# Patient Record
Sex: MALE | Race: WHITE | Employment: FULL TIME | URBAN - METROPOLITAN AREA
[De-identification: names, ages, dates, MRNs, and addresses within clinical notes are randomized per-mention and may not be internally consistent; named-entity substitution may affect disease eponyms.]

---

## 2017-09-22 ENCOUNTER — TELEPHONE (OUTPATIENT)
Dept: ORTHOPEDIC SURGERY | Age: 52
End: 2017-09-22

## 2019-04-29 ENCOUNTER — HOSPITAL ENCOUNTER (EMERGENCY)
Age: 54
Discharge: HOME | End: 2019-04-29
Payer: OTHER MISCELLANEOUS

## 2019-04-29 ENCOUNTER — APPOINTMENT (OUTPATIENT)
Dept: GENERAL RADIOLOGY | Age: 54
End: 2019-04-29
Payer: OTHER MISCELLANEOUS

## 2019-04-29 VITALS
BODY MASS INDEX: 25.48 KG/M2 | HEIGHT: 69 IN | DIASTOLIC BLOOD PRESSURE: 84 MMHG | WEIGHT: 172 LBS | TEMPERATURE: 98.3 F | SYSTOLIC BLOOD PRESSURE: 132 MMHG | RESPIRATION RATE: 14 BRPM | OXYGEN SATURATION: 96 % | HEART RATE: 65 BPM
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
## 2019-04-29 DIAGNOSIS — S20.212A CONTUSION OF LEFT CHEST WALL, INITIAL ENCOUNTER: Primary | ICD-10-CM

## 2019-04-29 PROCEDURE — 71101 X-RAY EXAM UNILAT RIBS/CHEST: CPT

## 2019-04-29 PROCEDURE — 6370000000 HC RX 637 (ALT 250 FOR IP): Performed by: PHYSICIAN ASSISTANT

## 2019-04-29 PROCEDURE — 99284 EMERGENCY DEPT VISIT MOD MDM: CPT

## 2019-04-29 RX ORDER — NICOTINE POLACRILEX 2 MG
1 GUM BUCCAL DAILY
COMMUNITY

## 2019-04-29 RX ORDER — ACETAMINOPHEN 500 MG
1000 TABLET ORAL ONCE
Status: COMPLETED | OUTPATIENT
Start: 2019-04-29 | End: 2019-04-29

## 2019-04-29 RX ORDER — IBUPROFEN 600 MG/1
600 TABLET ORAL EVERY 6 HOURS PRN
Qty: 20 TABLET | Refills: 0 | Status: ON HOLD | OUTPATIENT
Start: 2019-04-29 | End: 2021-11-24 | Stop reason: HOSPADM

## 2019-04-29 RX ORDER — METHOCARBAMOL 500 MG/1
500 TABLET, FILM COATED ORAL EVERY 6 HOURS PRN
Qty: 20 TABLET | Refills: 0 | Status: SHIPPED | OUTPATIENT
Start: 2019-04-29 | End: 2019-05-09

## 2019-04-29 RX ORDER — ATORVASTATIN CALCIUM 20 MG/1
20 TABLET, FILM COATED ORAL DAILY
COMMUNITY

## 2019-04-29 RX ORDER — IBUPROFEN 800 MG/1
800 TABLET ORAL ONCE
Status: COMPLETED | OUTPATIENT
Start: 2019-04-29 | End: 2019-04-29

## 2019-04-29 RX ADMIN — ACETAMINOPHEN 1000 MG: 500 TABLET ORAL at 17:50

## 2019-04-29 RX ADMIN — IBUPROFEN 800 MG: 800 TABLET, FILM COATED ORAL at 17:50

## 2019-04-29 ASSESSMENT — PAIN SCALES - GENERAL
PAINLEVEL_OUTOF10: 3
PAINLEVEL_OUTOF10: 3

## 2019-04-29 ASSESSMENT — PAIN DESCRIPTION - LOCATION: LOCATION: RIB CAGE

## 2019-04-29 ASSESSMENT — PAIN DESCRIPTION - DESCRIPTORS: DESCRIPTORS: ACHING

## 2019-04-29 ASSESSMENT — PAIN DESCRIPTION - ORIENTATION: ORIENTATION: LEFT

## 2019-04-29 ASSESSMENT — PAIN DESCRIPTION - PAIN TYPE: TYPE: ACUTE PAIN
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
## 2019-05-24 ENCOUNTER — OFFICE VISIT (OUTPATIENT)
Dept: ORTHOPEDIC SURGERY | Age: 54
End: 2019-05-24
Payer: COMMERCIAL

## 2019-05-24 VITALS — WEIGHT: 171.96 LBS | BODY MASS INDEX: 25.47 KG/M2 | HEIGHT: 69 IN

## 2019-05-24 DIAGNOSIS — M17.12 PRIMARY OSTEOARTHRITIS OF LEFT KNEE: ICD-10-CM

## 2019-05-24 DIAGNOSIS — R52 PAIN: Primary | ICD-10-CM

## 2019-05-24 DIAGNOSIS — M17.0 BILATERAL PRIMARY OSTEOARTHRITIS OF KNEE: ICD-10-CM

## 2019-05-24 PROCEDURE — 99203 OFFICE O/P NEW LOW 30 MIN: CPT | Performed by: ORTHOPAEDIC SURGERY
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
## 2019-05-24 NOTE — PROGRESS NOTES
Neha Para  WFM#-[id number]  LOT#- [id number]  BNB:[id number]    4CC XYLOCAINE  [facility] [place]#-[id number]  KXY#[id number]  EXP: 10/2022    2CC DEPO  NDC#-[id number]  LNU#-[id number]  EXP: 01/2021    SITE: LEFT KNEE

## 2019-08-21 ENCOUNTER — OFFICE VISIT (OUTPATIENT)
Dept: ORTHOPEDIC SURGERY | Age: 54
End: 2019-08-21
Payer: COMMERCIAL

## 2019-08-21 VITALS
DIASTOLIC BLOOD PRESSURE: 80 MMHG | WEIGHT: 171.96 LBS | HEIGHT: 69 IN | HEART RATE: 69 BPM | SYSTOLIC BLOOD PRESSURE: 130 MMHG | BODY MASS INDEX: 25.47 KG/M2

## 2019-08-21 DIAGNOSIS — M54.2 NECK PAIN: Primary | ICD-10-CM

## 2019-08-21 PROCEDURE — 99214 OFFICE O/P EST MOD 30 MIN: CPT | Performed by: PHYSICIAN ASSISTANT

## 2019-08-21 NOTE — PROGRESS NOTES
walks with a normal gait. HEENT:   His cervical flexion, extension, and axial rotation are moderately reduced with pain. His skin is warm and dry. He has no tenderness over his cervical spine and no obvious muscle spasm. The skin over his cervical spine is normal without surgical scar. Upper extremities:  He has 5/5 strength of his interosseous muscles, wrist dorsiflexors and volarflexors, biceps, triceps, deltoids, and internal and external rotators of his shoulders, bilaterally. His biceps, triceps, bracheoradialis, quadriceps and achilles reflexes are 2+, bilaterally. Sensation is intact to light touchfrom C6 to C8. He has no clonus and negative Urena's bilaterally. He has mild pain with left shoulder range of motion. Lower extremities:  Normal DTR knees, no clonus. Imaging:   I independently reviewed AP and lateral xray images of his cervical spine from the office today. They show mild multilevel spondylosis without obvious acute fracture or dislocation. Images also reviewed with Dr. Angela Aguiar today who did not see evidence of acute fracture. Assessment:   Cervical spondylosis without radiculopathy. Cervical myalgia     Plan:   He will continue with chiropractic care and home exercise program. He was given information for Renea exercises to try at home, as well as home cervical traction unit. He will call to schedule a cervical MRI or return for additional evaluation if his symptoms increase or worsen, or if he develops new neurologic symptoms. He may need to see a shoulder specialist if his left shoulder pain persists.      Santi Jacob PA-C

## 2021-05-28 ENCOUNTER — HOSPITAL ENCOUNTER (OUTPATIENT)
Dept: PHYSICAL THERAPY | Age: 56
Setting detail: THERAPIES SERIES
Discharge: HOME | End: 2021-05-28
Payer: COMMERCIAL

## 2021-05-28 PROCEDURE — 97140 MANUAL THERAPY 1/> REGIONS: CPT

## 2021-05-28 PROCEDURE — 97110 THERAPEUTIC EXERCISES: CPT

## 2021-05-28 PROCEDURE — 97161 PT EVAL LOW COMPLEX 20 MIN: CPT

## 2021-05-28 NOTE — PROGRESS NOTES
[facility]  [address]     [place], [facility]  Phone: [phone]       Fax:   [phone]        Dear  Referring Practitioner: Brenton Vigil. Perla Krause CNP; Mariana Ni,    We had the pleasure of evaluating the following patient for physical therapy services at [address] [facility]. A summary of our findings can be found in the initial assessment below. This includes our plan of care. If you have any questions or concerns regarding these findings, please do not hesitate to contact me at the office phone number above. Thank you for this referral.       Physician/Provider Signature:_______________________________Date:__________________  By signing above (or electronic signature), therapist's plan is approved by physician          Patient: Rayo Tribridge Press     : 1965     MRN: [id number]    Referring Physician: Referring Practitioner: RAJWINDER Krause CNP; Freddy Smiley MD        Evaluation Date: 2021        Medical Diagnosis Information:  Diagnosis: Neck pain; M54.2   Treatment Diagnosis: neck pain and radicular symptoms into left UE                                           Insurance information: PT Insurance Information: Nhan Yates, does not need precert     Precautions/ Contra-indications:   Latex Allergy:  [x]NO      []YES      Preferred Language for Healthcare:   [x]English       []other:  C-SSRS Triggered by Intake questionnaire (Past 2 wk assessment):   [x] No, Questionnaire did not trigger screening.   [] Yes, Patient intake triggered further evaluation      [] C-SSRS Screening completed  [] PCP notified via Plan of Care  [] Emergency services notified      SUBJECTIVE FINDINGS      History of Present Illness:      Pt presents with C/o pain in left neck, scapular region and radicular symptoms (tingling) down left UE. Pt reports symptoms began 4:00 AM 2021.  Pt states that the day before he was tossing concrete blocks and working overhead. Pt reports pain with deep breathing. Pt reports that he has had overall noted relief since medications given by MD.      Pain       Patient reports pain is  2/10 pain at present and  9/10 pain at its worst.  Pain increases with : sitting      Decreases with: standing decrease in shoulder blade region    Pt. reports pain with coughing, sneezing and laughing:   [x]Yes   []No    []NA     Current Functional Limitations:   [x]Yes   []No  Functional Complaints: sitting      PLOF:   [x]No functional limitations   []Pre-exisiting limitations:  Pt's sleep is affected?    [x]Yes   []No       Can not sleep on left side, pt is left handed    Relevant Medical History:    Functional Scale/Score:  Measure Used: NDI  Score: 8/50     Occupation/School: working at [email or web address] 8-12 hours      Sport/recreational activities: none right now    Patient goal for therapy:  \"no pain\"    OBJECTIVE FINDINGS    Gait/Steps/Balance       [x]Gait WNL                           [] Deviations on a level linoleum surface include:      Balance tests      Hautard's test: []NT [x]Neg  []Pos with R rot  []Pos with L rot  []Pos with ext        Posture: [x] WNL  []Forward head  []Forward flexed trunk   []Pronounced CT junction    []Increased thoracic kyphosis   []Scoliosis  []Scapular winging  []Decreased WB on   []R   []L     []Other:       Range of Motion  [x]All WFL ( Exceptions marked below) []Cervical Spine   []Thoracic Spine     ROM Deficits Identified             *Denotes pain AROM              % Decresased PROM              % Decreased MMT/Resisted Tests   Flexion 10%     Extension 0%     Sidebending Left 25%     Sidebending Right 25%     Rotation Left 25%     Rotation Right 25%         UE Range of Motion/Strength Testing      [x]All ROM WFL except as marked below   [x]All strength WFL (5/5) except as marked below    [x]All myotomes WFL (5/5) except as marked below    Range Tested MMT/ Resisted PROM AROM Comments   *denotes pain Left Right Left Right Left Right    Cervical Flexion C1-2     10% decreased     Cervical Sidebend  C3     25% decreased 25% decreased    Shoulder Shrug  C4          Shoulder Flexion 4+*    175* 175 pain   Shoulder Extension          Shoulder Abduction  C5     170* 175 pain   Shoulder Adduction           Shoulder IR     Mid thoracic Mid thoracic    Shoulder ER     T2 T2    Elbow Flexion  C6          Elbow Extension C7 4+         Pronation          Supination          Wrist Flexion C7          Wrist Extension C6          Radial Deviation          Ulnar Deviation            N/T N/T        Thumb Ext C8          Intrinsics T1 4+           Scapular Strength    []All WFL (5/5) except as marked below   [x]NT   Scapula Left Right   Upper Trapezius     Middle Trapezius     Lower Trapezius     Rhomboid     Serratus Anterior       Latissimus Dorsi         Deep Tendon Reflexes   [x]All reflexes WNL (2+)    []NT   Abnormal Reflex Findings Left Right   Biceps (C5,6)     Brachioradialis (C6)     Triceps (C7)     Abductor Digiti Minimi (C8,T1)     Quadriceps (L3,4)     Achilles (S1,2)     Ankle clonus N/T N/T   Clarice's reflex      Babinski's reflex  NT NT      Dermatomal Sensation  [x]All dermatomes WNL for light touch except as marked below   Abnormal Dermatome Findings Left Right   Posterior Head (C2)     Posterior Neck (C3)     Supraclavicular (C4)     Lateral Upper Arm (C5)     Lateral Forearm/1st(C6)     3rd digit (C7)     5th digit (C8)      Medial Arm (T1)        Flexibility     Muscle Abnormal Findings   Pectoralis Minor []WNL   []Decreased R   []Decreased L      Levator Scapula []WNL   []Decreased R   [x]Decreased L      Scalenes []WNL   []Decreased R   []Decreased L      Upper Trapezius  []WNL   []Decreased R   [x]Decreased L      Suboccipitals  []WNL   []Decreased R   []Decreased L      Other:  []WNL   []Decreased R   []Decreased L        Special Tests- cervical/thoracic seated     Special Test Abnormal Findings   [facility] c-spine rules  []Neg   []Pos   [x]NA   Herbie Young []Neg   []Pos      Vertebral Artery/Positional Provocative Testing []Neg   []Pos R   []Pos L      Spurling's/Foraminal []Neg   []Pos R   []Pos L      Distraction [x]Relief noted   []No relief noted      Compression  [x]Neg   []Pos      Elevated Arm Stress Test (EAST)  []Neg   []Pos R   []Pos L      Thoracic Outlet   Other:  []Neg   []Pos R   []Pos L      Other:  []Neg   []Pos R   []Pos L      Other:  []Neg   []Pos R   []Pos L        Palpation     Patient reported tenderness with palpation:  []Yes   [x]No   []NA  Location: left paraspinals     Special Tests-supine     Special Test Abnormal Findings   Alar ligament/ C2 spinous kick test [x]Neg   []Pos R   []Pos L      Vertebral artery/Positional provocative testing [x]Neg   []Pos R   []Pos L      Modified shear test  []Neg   []Pos R   []Pos L      Median nerve tension test []Neg   []Pos R   []Pos L      Radial nerve tension test []Neg   []Pos R   []Pos L      Ulnar nerve tension test  []Neg   []Pos R   []Pos L      Other []Neg   []Pos R   []Pos L      Other  []Neg   []Pos R   []Pos L      Decreased 1 st rib mob on left; rib torsion on left. Bandages/Dressings/Incisions: [x]None                          [x] Patient history, allergies, meds reviewed. Medical chart reviewed. See intake form. Review Of Systems (ROS):  [x]Performed Review of systems (Integumentary, CardioPulmonary, Neurological) by intake and observation. Intake form has been scanned into medical record. Patient has been instructed to contact their primary care physician regarding ROS issues if not already being addressed at this time.         Co-morbidities/Complexities (which will affect course of rehabilitation):   []None           Arthritic conditions   []Rheumatoid arthritis (M05.9)  []Osteoarthritis (M19.91)   Cardiovascular conditions   [x]Hypertension (I10)  []Hyperlipidemia (E78.5)  []Angina pectoris (I20)  []Atherosclerosis (I70) Musculoskeletal conditions   []Disc pathology   []Congenital spine pathologies   []Prior surgical intervention  []Osteoporosis (M81.8)  []Osteopenia (M85.8)   Endocrine conditions   []Hypothyroid (E03.9)  []Hyperthyroid Gastrointestinal conditions   []Constipation (C59.49)   Metabolic conditions   []Morbid obesity (E66.01)  []Diabetes type 1(E10.65) or 2 (E11.65)   []Neuropathy (G60.9)     Pulmonary conditions   []Asthma (J45)  []Coughing   []COPD (J44.9)   Psychological Disorders  []Anxiety (F41.9)  []Depression (F32.9)   []Other:   []Other:            Barriers to/and or personal factors that will affect rehab potential:              []Age  []Sex    []Smoker              []Motivation/Lack of Motivation                        []Co-Morbidities              []Cognitive Function, education/learning barriers              []Environmental, home barriers              []profession/work barriers  []past PT/medical experience  []other:  Justification:     Falls Risk Assessment (30 days): []NA   [x] Falls Risk assessed and no intervention required. [] Falls Risk assessed and Patient requires intervention due to being higher risk   Tinetti score :     [] Falls education provided, including:           ASSESSMENT:  Pt is 55 Y. O  male, presenting with c/o  Pain in cervical, left arm and scapular region. Assessment reveals deficits in strength, ROM, alignment as well as increased pain. Pt will benefit from cont PT to address these deficits and promote return to highest level of functional independence.      Functional Impairments:     [x]Noted cervical/thoracic/Glenohumeral joint hypomobility  [x]Decreased cervical/UE functional ROM  []Decreased Deep Cervical Flexor control or ability to hold head up  []Decreased Rotator Cuff/scapular/core strength and neuromuscular control   []Decreased Upper Extremity functional strength     []Noted cervical/thoracic/GHJ joint hypermobility  []Abnormal reflexes/sensation/myotomal/dermatomal syndromes  []signs/symptoms consistent with pathology which may benefit from Dry Needling    []signs/symptoms which may limit the use of advanced manual therapy techniques: (Elevated CV risk profile, recent trauma, intolerance to end range positions, prior TIA, visual issues, UE neurological compromise )       Prognosis/Rehab Potential:      []Excellent   [x]Good    []Fair   []Poor    Tolerance of evaluation/treatment:    []Excellent   [x]Good    []Fair   []Poor     Physical Therapy Evaluation Complexity Justification  [x] A history of present problem with:  [] no personal factors and/or comorbidities that impact the plan of care;  [x]1-2 personal factors and/or comorbidities that impact the plan of care  []3 personal factors and/or comorbidities that impact the plan of care  [x] An examination of body systems using standardized tests and measures addressing any of the following: body structures and functions (impairments), activity limitations, and/or participation restrictions;:  [] a total of 1-2 or more elements   [x] a total of 3 or more elements   [] a total of 4 or more elements   [x] A clinical presentation with:  [x] stable and/or uncomplicated characteristics   [] evolving clinical presentation with changing characteristics  [] unstable and unpredictable characteristics;   [x] Clinical decision making of [x] low, [] moderate, [] high complexity using standardized patient assessment instrument and/or measurable assessment of functional outcome. [x] EVAL (LOW) 42842 (typically 20 minutes face-to-face)  [] EVAL (MOD) 63777 (typically 30 minutes face-to-face)  [] EVAL (HIGH) 05241 (typically 45 minutes face-to-face)  [] RE-EVAL       PLAN: Begin PT focusing on:    Manual cervical distraction     Strengthening and stretching exercises as able.      Frequency/Duration:  2 days per week for 4 Weeks:  Interventions:  [x]  Therapeutic exercise including: strength training, ROM, for cervical spine,scapula, core control and activation of  Deep cervical stabilizers to allow for proper functional mobility as indicated by patients Functional Deficits. [] Progressing: [] Met: [] Not Met: [] Adjusted        Electronically signed by:   Luis Haywood PT MPT  License #[id number]

## 2021-05-28 NOTE — FLOWSHEET NOTE
Canalith Repositioning Procedure:      Manual Therapy:  0 minutes  Cervical manual distraction 1x10 reps   1st rib mob left gentle  MET for rib torsion left in sidelying, T4    Modalities: 0 minutes  Instructed to ice for 10 minutes cervical spine 1x/day    Therapeutic Exercise and NMR EXR  [x] (64997) Provided verbal/tactile cueing for activities related to strengthening, flexibility, endurance, ROM  for improvements in proximal hip and core control with self care, mobility, lifting and ambulation.  [] (07728) Provided verbal/tactile cueing for activities related to improving balance, coordination, kinesthetic sense, posture, motor skill, proprioception  to assist with core control in self care, mobility, lifting, and ambulation.      Therapeutic Activities:    [] (54057 or 94683) Provided verbal/tactile cueing for activities related to improving balance, coordination, kinesthetic sense, posture, motor skill, proprioception and motor activation to allow for proper function  with self care and ADLs  [] (07111) Provided training and instruction to the patient for proper core and proximal hip recruitment and positioning with ambulation re-education     Home Exercise Program:    [x] (47417) Reviewed/Progressed HEP activities related to strengthening, flexibility, endurance, ROM of core, proximal hip and LE for functional self-care, mobility, lifting and ambulation   [] (16016) Reviewed/Progressed HEP activities related to improving balance, coordination, kinesthetic sense, posture, motor skill, proprioception of core, proximal hip and LE for self care, mobility, lifting, and ambulation      Manual Treatments:  PROM / STM / Oscillations-Mobs:  G-I, II, III, IV (PA's, Inf., Post.)  [x] (95582) Provided manual therapy to mobilize proximal hip and LS spine soft tissue/joints for the purpose of modulating pain, promoting relaxation,  increasing ROM, reducing/eliminating soft tissue swelling/inflammation/restriction, improving soft tissue extensibility and allowing for proper ROM for normal function with self care, mobility, lifting and ambulation. Modalities:       Charges:  Timed Code Treatment Minutes: 23   Total Treatment Minutes: 47     [x] EVAL  [x] LX(72824) x1    [] IONTO  [] NMR (82893) x     [] VASO  [x] Manual (64738) x 1     [] Other:  [] TA x     [] Gait x   [] Mech Traction (85130)  [] ES(attended) (22245)     [] ES (un) (66017):     ASSESSMENT:  Pt had noted relief from cervical distraction     Goals:  GOALS:  Patient stated goal: no pain  []? Progressing: []? Met: []? Not Met: []? Adjusted     Therapist goals for Patient:   Short Term Goals: To be achieved in: 2 weeks  - Independent in HEP and progression per patient tolerance, in order to prevent re-injury. []? Progressing: []? Met: []? Not Met: []? Adjusted  - Patient will have a decrease in pain to facilitate improvement in movement, function, and ADLs as indicated by Functional Deficits. []? Progressing: []? Met: []? Not Met: []? Adjusted     Long Term Goals: To be achieved in: 4 weeks  - Disability index score of 4% or less for the NDI to assist with reaching prior level of function. []? Progressing: []? Met: []? Not Met: []? Adjusted  - Patient will demonstrate increased passive and active ROM to full, symmetrical and painfree to allow for proper joint functioning as indicated by patients Functional Deficits. .   []? Progressing: []? Met: []? Not Met: []? Adjusted  - Patient will demonstrate an increase in Strength to full, symmetrical and painfree to allow for proper functional mobility as indicated by patients Functional Deficits. - Patient will return to desired higher level, functional activities without increased symptoms or restriction. []? Progressing: []? Met: []? Not Met: []?  Adjusted  -Patient will demonstrate an increase in postural awareness and control and activation of  Deep cervical stabilizers to allow for proper functional mobility as indicated by patients Functional Deficits. []? Progressing: []? Met: []? Not Met: []? Adjusted            Overall Progression Towards Functional goals/ Treatment Progress Update:  [x] Patient is progressing as expected towards functional goals listed. [] Progression is slowed due to complexities/Impairments listed. [] Progression has been slowed due to co-morbidities. [x] Plan just implemented, too soon to assess goals progression <30days   [] Goals require adjustment due to lack of progress  [] Patient is not progressing as expected and requires additional follow up with physician  [] Other    Prognosis for POC: [x] Good [] Fair  [] Poor      Patient requires continued skilled intervention: [x] Yes  [] No    Treatment/Activity Tolerance:  [x] Patient able to complete treatment  [] Patient limited by fatigue  [] Patient limited by pain    [] Patient limited by other medical complications  [] Other:         PLAN: See eval  [] Continue per plan of care [] Alter current plan (see comments above)  [x] Plan of care initiated [] Hold pending MD visit [] Discharge    Plan Moving Forward/ For next visit:     manual cervical distraction     Strengthening and stretching exercises as able  Nakita Pastrana         Electronically signed by: Araceli Camp, PT MPT, [id number]  Note: If patient does not return for scheduled/ recommended follow up visits, this note will serve as a discharge from care along with most recent update on progress.

## 2021-06-02 ENCOUNTER — HOSPITAL ENCOUNTER (OUTPATIENT)
Dept: PHYSICAL THERAPY | Age: 56
Setting detail: THERAPIES SERIES
Discharge: HOME | End: 2021-06-02
Payer: COMMERCIAL

## 2021-06-04 ENCOUNTER — HOSPITAL ENCOUNTER (OUTPATIENT)
Dept: PHYSICAL THERAPY | Age: 56
Setting detail: THERAPIES SERIES
Discharge: HOME | End: 2021-06-04
Payer: COMMERCIAL

## 2021-06-04 PROCEDURE — 97140 MANUAL THERAPY 1/> REGIONS: CPT

## 2021-06-04 PROCEDURE — 97110 THERAPEUTIC EXERCISES: CPT

## 2021-06-04 NOTE — FLOWSHEET NOTE
Physical Therapy Daily Treatment Note  Date:  2021    Patient Name:  Ilda Parsons Press    :  1965  MRN: [id number]      Medical/Treatment Diagnosis Information:  · Diagnosis: Neck pain; M54.2  Treatment Diagnosis: neck pain and radicular symptoms into left UE     Insurance/Certification information:   PT Insurance Information: Jose Cruz, does not need precert      Physician Information:  Referring Practitioner: RAJWINDER Rodriguez CNP; [address] [facility]        Plan of care signed (Y/N): N    Date of Patient follow up with Physician:     Is this a Progress Report:     []  Yes  [x]  No      If Yes:  Date Range for reporting period:  Beginning 2021  Ending    Progress report will be due (10 Rx or 30 days whichever is less):  7555  Recertification will be due (POC Duration  / 90 days whichever is less):       Visit # Insurance Allowable Auth Required    BMN []  Yes [x]  No        Functional Scale:     Date assessed:  2021    NDI     Latex Allergy:  [x]NO      []YES  Preferred Language for Healthcare:   [x]English       []other:    Pain level: 1/10 pain at present and 1/10 pain at its worst.    SUBJECTIVE:  Pt reports that he is not having pain in shoulder blade, just at C7-T1 dull aching. RESTRICTIONS/PRECAUTIONS: none listed on prescription    OBJECTIVE:   Palpation:No tenderness at rib region  AROM: Slight decrease in rotation to right compared to left    Exercises/Interventions:     Exercises in bold performed in department today. Items not bolded are carried forward from prior visits for continuity of the record.   Exercise/Equipment Resistance/Repetitions HEP Other comments    nods supine 1x10 []    Shoulders shrugs 1x10 []    Shoulder blade squeezes   1x10 []    Bicep curls   3x10  5lbs []    triceps   3x10 5 lbs []     isometric extension  1x10 hold 5 seconds []        []        []        []          []         []        []        []        []        []        []        []        [] Therapeutic Exercise/Home Exercise Program:   21 minutes  Educated pt on anatomy, condition and treatment. See above exercise  Access code [email or web address]    Therapeutic Activity:  0 minutes     Gait: 0 minutes    Neuromuscular Re-Education:  0 minutes      Canalith Repositioning Procedure:      Manual Therapy:  25 minutes  Cervical manual distraction 1x10 reps   Decreased rotation to right cervical spine; contract/relax 1x5 reps and educated pt on. Modalities: 0 minutes  Instructed to ice for 10 minutes cervical spine 1x/day    Therapeutic Exercise and NMR EXR  [x] (71582) Provided verbal/tactile cueing for activities related to strengthening, flexibility, endurance, ROM  for improvements in proximal hip and core control with self care, mobility, lifting and ambulation.  [] (22848) Provided verbal/tactile cueing for activities related to improving balance, coordination, kinesthetic sense, posture, motor skill, proprioception  to assist with core control in self care, mobility, lifting, and ambulation.      Therapeutic Activities:    [] (91565 or 13892) Provided verbal/tactile cueing for activities related to improving balance, coordination, kinesthetic sense, posture, motor skill, proprioception and motor activation to allow for proper function  with self care and ADLs  [] (87966) Provided training and instruction to the patient for proper core and proximal hip recruitment and positioning with ambulation re-education     Home Exercise Program:    [x] (44911) Reviewed/Progressed HEP activities related to strengthening, flexibility, endurance, ROM of core, proximal hip and LE for functional self-care, mobility, lifting and ambulation   [] (73201) Reviewed/Progressed HEP activities related to improving balance, coordination, kinesthetic sense, posture, motor skill, proprioception of core, proximal hip and LE for self care, mobility, lifting, and ambulation      Manual Treatments:  PROM / STM / Oscillations-Mobs: G-I, II, III, IV (PA's, Inf., Post.)  [x] (18799) Provided manual therapy to mobilize proximal hip and LS spine soft tissue/joints for the purpose of modulating pain, promoting relaxation,  increasing ROM, reducing/eliminating soft tissue swelling/inflammation/restriction, improving soft tissue extensibility and allowing for proper ROM for normal function with self care, mobility, lifting and ambulation. Modalities:       Charges:  Timed Code Treatment Minutes: Total Treatment Minutes:      [] EVAL  [x] FA(89676) x1    [] IONTO  [] NMR (30567) x     [] VASO  [x] Manual (29304) x 2  [] Other:  [] TA x     [] Gait x   [] Mech Traction (56571)  [] ES(attended) (59174)     [] ES (un) (74379):     ASSESSMENT:  Pt had noted relief from cervical distraction. Goals:  GOALS:  Patient stated goal: no pain  []? Progressing: []? Met: []? Not Met: []? Adjusted     Therapist goals for Patient:   Short Term Goals: To be achieved in: 2 weeks  - Independent in HEP and progression per patient tolerance, in order to prevent re-injury. []? Progressing: []? Met: []? Not Met: []? Adjusted  - Patient will have a decrease in pain to facilitate improvement in movement, function, and ADLs as indicated by Functional Deficits. []? Progressing: []? Met: []? Not Met: []? Adjusted     Long Term Goals: To be achieved in: 4 weeks  - Disability index score of 4% or less for the NDI to assist with reaching prior level of function. []? Progressing: []? Met: []? Not Met: []? Adjusted  - Patient will demonstrate increased passive and active ROM to full, symmetrical and painfree to allow for proper joint functioning as indicated by patients Functional Deficits. .   []? Progressing: []? Met: []? Not Met: []? Adjusted  - Patient will demonstrate an increase in Strength to full, symmetrical and painfree to allow for proper functional mobility as indicated by patients Functional Deficits.    - Patient will return to desired higher level,

## 2021-06-09 ENCOUNTER — HOSPITAL ENCOUNTER (OUTPATIENT)
Dept: PHYSICAL THERAPY | Age: 56
Setting detail: THERAPIES SERIES
Discharge: HOME | End: 2021-06-09
Payer: COMMERCIAL

## 2021-06-09 NOTE — PROGRESS NOTES
Physical Therapy  Patient called and spoke to Eben Fraga and stated he was doing much better and was not having any more pain and wanted to Discharge and cancel all of his appointments.

## 2021-06-09 NOTE — PROGRESS NOTES
Outpatient Physical Therapy Discharge Note         Date:  6/9/2021    Patient Name:  Denver Mcclelland         YOB: 1965    Medical/Treatment Diagnosis Information:  · Diagnosis: Neck pain; M54.2  · Treatment Diagnosis: neck pain and radicular symptoms into left UE      Insurance/Certification information:   PT Insurance Information: Margret Barnett, does not need precert      Physician Information:  Referring Practitioner: RAJWINDER Gusman       Plan of care signed (Y/N): yes 6/3/2021     Progress Note covers period from (if applicable):    [x]From 5/28/2021         To 6/9/2021          Visit# / total visits:  2        Subjective:      [] NA due to pt did not show for appointment  [x] Pt called to state that he is doing much better and was not having any more pain. Pt wanted to cancel remaining appointments and discharge today. Objective:    [x]Unable to reassess for discharge note due to pt   [x] Cancelled   [] Did not show  [] Requests discharge   []See last daily/progress note for most recent treatment/assessment         Assessment: Pt had less radicular symptoms last visit. Goals:   Progress towards goals:   [] All Goals Met   [] Goals Partially Met   [x]  Goals not able to be assessed secondary to not returning. Plan:    []Discharge from PT at this time      Thank you for your referral of this patient. Electronically signed by:   Oliva Bullock, PT, MPT, OMT-c #[id number]

## 2021-06-16 ENCOUNTER — APPOINTMENT (OUTPATIENT)
Dept: PHYSICAL THERAPY | Age: 56
End: 2021-06-16
Payer: COMMERCIAL

## 2021-06-18 ENCOUNTER — APPOINTMENT (OUTPATIENT)
Dept: PHYSICAL THERAPY | Age: 56
End: 2021-06-18
Payer: COMMERCIAL

## 2021-08-13 ENCOUNTER — HOSPITAL ENCOUNTER (EMERGENCY)
Age: 56
Discharge: HOME | End: 2021-08-13
Attending: EMERGENCY MEDICINE
Payer: COMMERCIAL

## 2021-08-13 VITALS
HEIGHT: 69 IN | TEMPERATURE: 98.6 F | SYSTOLIC BLOOD PRESSURE: 151 MMHG | RESPIRATION RATE: 18 BRPM | BODY MASS INDEX: 26.96 KG/M2 | HEART RATE: 97 BPM | OXYGEN SATURATION: 99 % | DIASTOLIC BLOOD PRESSURE: 70 MMHG | WEIGHT: 182 LBS

## 2021-08-13 DIAGNOSIS — L02.31 ABSCESS OF BUTTOCK: Primary | ICD-10-CM

## 2021-08-13 PROCEDURE — 96366 THER/PROPH/DIAG IV INF ADDON: CPT

## 2021-08-13 PROCEDURE — 10061 I&D ABSCESS COMP/MULTIPLE: CPT

## 2021-08-13 PROCEDURE — 96365 THER/PROPH/DIAG IV INF INIT: CPT

## 2021-08-13 PROCEDURE — 6370000000 HC RX 637 (ALT 250 FOR IP): Performed by: EMERGENCY MEDICINE

## 2021-08-13 PROCEDURE — 6360000002 HC RX W HCPCS: Performed by: EMERGENCY MEDICINE

## 2021-08-13 PROCEDURE — 2580000003 HC RX 258: Performed by: EMERGENCY MEDICINE

## 2021-08-13 PROCEDURE — 99284 EMERGENCY DEPT VISIT MOD MDM: CPT

## 2021-08-13 RX ORDER — HYDROCHLOROTHIAZIDE 25 MG/1
TABLET ORAL
COMMUNITY
Start: 2021-07-27

## 2021-08-13 RX ORDER — SODIUM CHLORIDE 9 MG/ML
1000 INJECTION, SOLUTION INTRAVENOUS CONTINUOUS
Status: DISCONTINUED | OUTPATIENT
Start: 2021-08-13 | End: 2021-08-14 | Stop reason: HOSPADM

## 2021-08-13 RX ORDER — OXYCODONE HYDROCHLORIDE AND ACETAMINOPHEN 5; 325 MG/1; MG/1
1 TABLET ORAL EVERY 6 HOURS PRN
Qty: 12 TABLET | Refills: 0 | Status: SHIPPED | OUTPATIENT
Start: 2021-08-13 | End: 2021-08-16

## 2021-08-13 RX ORDER — OXYCODONE HYDROCHLORIDE AND ACETAMINOPHEN 5; 325 MG/1; MG/1
1 TABLET ORAL ONCE
Status: COMPLETED | OUTPATIENT
Start: 2021-08-13 | End: 2021-08-13

## 2021-08-13 RX ORDER — CEPHALEXIN 500 MG/1
500 CAPSULE ORAL 4 TIMES DAILY
Qty: 20 CAPSULE | Refills: 0 | Status: SHIPPED | OUTPATIENT
Start: 2021-08-13 | End: 2021-08-18

## 2021-08-13 RX ADMIN — SODIUM CHLORIDE 1000 ML: 9 INJECTION, SOLUTION INTRAVENOUS at 20:50

## 2021-08-13 RX ADMIN — OXYCODONE HYDROCHLORIDE AND ACETAMINOPHEN 1 TABLET: 5; 325 TABLET ORAL at 20:46

## 2021-08-13 RX ADMIN — VANCOMYCIN HYDROCHLORIDE 1000 MG: 1 INJECTION, POWDER, LYOPHILIZED, FOR SOLUTION INTRAVENOUS at 20:49

## 2021-08-13 ASSESSMENT — PAIN SCALES - GENERAL
PAINLEVEL_OUTOF10: 0
PAINLEVEL_OUTOF10: 4
PAINLEVEL_OUTOF10: 3

## 2021-08-13 ASSESSMENT — PAIN DESCRIPTION - PROGRESSION: CLINICAL_PROGRESSION: RESOLVED

## 2021-08-13 ASSESSMENT — PAIN DESCRIPTION - ORIENTATION: ORIENTATION: RIGHT

## 2021-08-13 ASSESSMENT — PAIN DESCRIPTION - PAIN TYPE: TYPE: ACUTE PAIN

## 2021-08-14 NOTE — ED PROVIDER NOTES
Emergency Department Attending Note    Jennyfer Alegre MD    Date of ED VIsit: 8/13/2021    CHIEF COMPLAINT  Abscess (on L butt cheak. Found it 2 days ago. States that today it got worse and has popped. Denies any fevers.)      HISTORY OF PRESENT ILLNESS  Awilda Mayer is a 55 y.o. male  With Vital signs of BP (!) 126/100   Pulse 92   Temp 98.6 °F (37 °C) (Oral)   Resp 18   Ht 5' 9\" (1.753 m)   Wt 182 lb (82.6 kg)   SpO2 100%   BMI 26.88 kg/m²  who presents to the ED with a complaint of left buttocks abscess. Patient seen and evaluated in room 10. Patient is 5 or 6 days post left total knee replacement and now comes in with a left buttocks abscess. He says that started about 2 to 3 days ago. He has pain when he sitting on that side. States that the pain is sharp and more when he sitting on it. He is tried Tylenol Motrin but it still hurts. No fevers no chills no cough. No complaints with the knees actually says PT is going well. No hip pain. .  No other complaints, modifying factors or associated symptoms. Patients Past medical history reviewed and listed below  Past Medical History:   Diagnosis Date    Chronic back pain     L2 or L4  problems, few times in past few years    Hyperlipidemia     No history of procedure      Past Surgical History:   Procedure Laterality Date    COLONOSCOPY  11/14/2016    polyp transverse colon    KNEE ARTHROSCOPY      KNEE SURGERY      VASECTOMY         I have reviewed the following from the nursing documentation. History reviewed. No pertinent family history.   Social History     Socioeconomic History    Marital status:      Spouse name: Not on file    Number of children: Not on file    Years of education: Not on file    Highest education level: Not on file   Occupational History    Not on file   Tobacco Use    Smoking status: Never Smoker    Smokeless tobacco: Never Used   Substance and Sexual Activity    Alcohol use: Not Currently distress. HEAD: Normocephalic. Atraumatic. EYES: PERRL. EOM's grossly intact. ENT: Mucous membranes are pink and moist.   NECK: Supple. HEART: RRR. No murmurs. LUNGS: Respirations unlabored. CTAB. Good air exchange. ABDOMEN: Soft. Non-distended. Non-tender. No masses. No organomegaly. No guarding or rebound. Buttocks: Patient has a quarter sized abscess on the left buttocks. EXTREMITIES: No peripheral edema. Moves all extremities equally. All extremities neurovascularly intact. SKIN: Warm and dry. No acute rashes. NEUROLOGICAL: Alert and oriented. Strength 5/5, sensation intact. Gait normal.   PSYCHIATRIC: Normal mood and affect. No HI or SI expressed to me. RADIOLOGY    If acquired see below     EKG:     If acquired see below       ED COURSE/MDM    Patient seen to be given vancomycin while we do the incision and drainage just to protect his knee replacement from any potential infection. So he will have an incision and drainage done please see note below. PROCEDURE:  [address] or their surrogate had an opportunity to ask questions, and the risks, benefits, and alternatives were discussed. The abscess was prepped and draped to maintain a sterile field. A local anesthetic was used to anesthetize the abscess. An incision was made to keep the abscess open so it will continue to drain. It was copiously irrigated with its loculations broken down. There were no complications during the procedure. The wound void was packed with iodoform gauze         The ED course and plan were reviewed and results discussed with the patient    The patient understood and agreed with the Discharge/transfer planning.     CLINICAL IMPRESSION and [address] was stable and diagnosed with buttocks abscess    Patient was treated with Percocet and vancomycin               Hany Scherer MD  08/13/21 7521

## 2021-08-14 NOTE — ED NOTES
Pt discharge instructions, follow up and rx x 2 reviewed with pt. Pt verbalized understanding. No further needs. Pt discharged at this time.         Shiva Godoy RN  08/13/21 2485

## 2021-11-22 ENCOUNTER — APPOINTMENT (OUTPATIENT)
Dept: GENERAL RADIOLOGY | Age: 56
DRG: 177 | End: 2021-11-22
Payer: COMMERCIAL

## 2021-11-22 ENCOUNTER — HOSPITAL ENCOUNTER (INPATIENT)
Age: 56
LOS: 2 days | Discharge: HOME | DRG: 177 | End: 2021-11-24
Attending: STUDENT IN AN ORGANIZED HEALTH CARE EDUCATION/TRAINING PROGRAM | Admitting: INTERNAL MEDICINE
Payer: COMMERCIAL

## 2021-11-22 DIAGNOSIS — R09.02 HYPOXIA: ICD-10-CM

## 2021-11-22 DIAGNOSIS — U07.1 COVID-19: Primary | ICD-10-CM

## 2021-11-22 PROBLEM — J12.82 PNEUMONIA DUE TO COVID-19 VIRUS: Status: ACTIVE | Noted: 2021-11-22

## 2021-11-22 LAB
A/G RATIO: 1 (ref 1.1–2.2)
ALBUMIN SERPL-MCNC: 3.9 G/DL (ref 3.4–5)
ALP BLD-CCNC: 106 U/L (ref 40–129)
ALT SERPL-CCNC: 36 U/L (ref 10–40)
ANION GAP SERPL CALCULATED.3IONS-SCNC: 14 MMOL/L (ref 3–16)
AST SERPL-CCNC: 28 U/L (ref 15–37)
BASE EXCESS VENOUS: 7 MMOL/L (ref -3–3)
BASOPHILS ABSOLUTE: 0.3 K/UL (ref 0–0.2)
BASOPHILS RELATIVE PERCENT: 2 %
BILIRUB SERPL-MCNC: 0.5 MG/DL (ref 0–1)
BUN BLDV-MCNC: 13 MG/DL (ref 7–20)
C-REACTIVE PROTEIN: 45.8 MG/L (ref 0–5.1)
CALCIUM SERPL-MCNC: 9.2 MG/DL (ref 8.3–10.6)
CARBOXYHEMOGLOBIN: 1.4 % (ref 0–1.5)
CHLORIDE BLD-SCNC: 93 MMOL/L (ref 99–110)
CO2: 29 MMOL/L (ref 21–32)
CREAT SERPL-MCNC: 0.8 MG/DL (ref 0.9–1.3)
EKG ATRIAL RATE: 73 BPM
EKG DIAGNOSIS: NORMAL
EKG P AXIS: 39 DEGREES
EKG P-R INTERVAL: 132 MS
EKG Q-T INTERVAL: 424 MS
EKG QRS DURATION: 84 MS
EKG QTC CALCULATION (BAZETT): 467 MS
EKG R AXIS: 37 DEGREES
EKG T AXIS: -35 DEGREES
EKG VENTRICULAR RATE: 73 BPM
EOSINOPHILS ABSOLUTE: 0 K/UL (ref 0–0.6)
EOSINOPHILS RELATIVE PERCENT: 0 %
GFR AFRICAN AMERICAN: >60
GFR NON-AFRICAN AMERICAN: >60
GLUCOSE BLD-MCNC: 105 MG/DL (ref 70–99)
HCO3 VENOUS: 32.5 MMOL/L (ref 23–29)
HCT VFR BLD CALC: 42.5 % (ref 40.5–52.5)
HEMOGLOBIN: 14.2 G/DL (ref 13.5–17.5)
INFLUENZA A: NOT DETECTED
INFLUENZA B: NOT DETECTED
LACTIC ACID, SEPSIS: 1.8 MMOL/L (ref 0.4–1.9)
LYMPHOCYTES ABSOLUTE: 0.6 K/UL (ref 1–5.1)
LYMPHOCYTES RELATIVE PERCENT: 4.8 %
MCH RBC QN AUTO: 27.4 PG (ref 26–34)
MCHC RBC AUTO-ENTMCNC: 33.4 G/DL (ref 31–36)
MCV RBC AUTO: 81.8 FL (ref 80–100)
METHEMOGLOBIN VENOUS: 0.3 %
MONOCYTES ABSOLUTE: 0.9 K/UL (ref 0–1.3)
MONOCYTES RELATIVE PERCENT: 6.9 %
NEUTROPHILS ABSOLUTE: 11.4 K/UL (ref 1.7–7.7)
NEUTROPHILS RELATIVE PERCENT: 86.3 %
O2 SAT, VEN: 77 %
O2 THERAPY: ABNORMAL
PCO2, VEN: 48.6 MMHG (ref 40–50)
PDW BLD-RTO: 14.2 % (ref 12.4–15.4)
PH VENOUS: 7.44 (ref 7.35–7.45)
PLATELET # BLD: 467 K/UL (ref 135–450)
PMV BLD AUTO: 7.1 FL (ref 5–10.5)
PO2, VEN: 40.2 MMHG (ref 25–40)
POTASSIUM SERPL-SCNC: 3.1 MMOL/L (ref 3.5–5.1)
PROCALCITONIN: 0.08 NG/ML (ref 0–0.15)
RBC # BLD: 5.19 M/UL (ref 4.2–5.9)
SARS-COV-2 RNA, RT PCR: DETECTED
SODIUM BLD-SCNC: 136 MMOL/L (ref 136–145)
TCO2 CALC VENOUS: 34 MMOL/L
TOTAL PROTEIN: 7.7 G/DL (ref 6.4–8.2)
TROPONIN: <0.01 NG/ML
TROPONIN: <0.01 NG/ML
WBC # BLD: 13.2 K/UL (ref 4–11)

## 2021-11-22 PROCEDURE — 1200000000 HC SEMI PRIVATE

## 2021-11-22 PROCEDURE — 96374 THER/PROPH/DIAG INJ IV PUSH: CPT

## 2021-11-22 PROCEDURE — 94761 N-INVAS EAR/PLS OXIMETRY MLT: CPT

## 2021-11-22 PROCEDURE — 6370000000 HC RX 637 (ALT 250 FOR IP): Performed by: NURSE PRACTITIONER

## 2021-11-22 PROCEDURE — 71045 X-RAY EXAM CHEST 1 VIEW: CPT

## 2021-11-22 PROCEDURE — 87040 BLOOD CULTURE FOR BACTERIA: CPT

## 2021-11-22 PROCEDURE — 84484 ASSAY OF TROPONIN QUANT: CPT

## 2021-11-22 PROCEDURE — 6370000000 HC RX 637 (ALT 250 FOR IP): Performed by: PHYSICIAN ASSISTANT

## 2021-11-22 PROCEDURE — 80053 COMPREHEN METABOLIC PANEL: CPT

## 2021-11-22 PROCEDURE — 2580000003 HC RX 258: Performed by: PHYSICIAN ASSISTANT

## 2021-11-22 PROCEDURE — 84145 PROCALCITONIN (PCT): CPT

## 2021-11-22 PROCEDURE — 36415 COLL VENOUS BLD VENIPUNCTURE: CPT

## 2021-11-22 PROCEDURE — 83605 ASSAY OF LACTIC ACID: CPT

## 2021-11-22 PROCEDURE — 93010 ELECTROCARDIOGRAM REPORT: CPT | Performed by: INTERNAL MEDICINE

## 2021-11-22 PROCEDURE — 86140 C-REACTIVE PROTEIN: CPT

## 2021-11-22 PROCEDURE — 93005 ELECTROCARDIOGRAM TRACING: CPT | Performed by: NURSE PRACTITIONER

## 2021-11-22 PROCEDURE — 2580000003 HC RX 258: Performed by: NURSE PRACTITIONER

## 2021-11-22 PROCEDURE — 6360000002 HC RX W HCPCS: Performed by: PHYSICIAN ASSISTANT

## 2021-11-22 PROCEDURE — 99285 EMERGENCY DEPT VISIT HI MDM: CPT

## 2021-11-22 PROCEDURE — 85025 COMPLETE CBC W/AUTO DIFF WBC: CPT

## 2021-11-22 PROCEDURE — 2700000000 HC OXYGEN THERAPY PER DAY

## 2021-11-22 PROCEDURE — 87636 SARSCOV2 & INF A&B AMP PRB: CPT

## 2021-11-22 PROCEDURE — 6360000002 HC RX W HCPCS: Performed by: NURSE PRACTITIONER

## 2021-11-22 PROCEDURE — 82803 BLOOD GASES ANY COMBINATION: CPT

## 2021-11-22 RX ORDER — DEXAMETHASONE SODIUM PHOSPHATE 10 MG/ML
8 INJECTION INTRAMUSCULAR; INTRAVENOUS ONCE
Status: COMPLETED | OUTPATIENT
Start: 2021-11-22 | End: 2021-11-22

## 2021-11-22 RX ORDER — HYDROCHLOROTHIAZIDE 25 MG/1
25 TABLET ORAL DAILY
Status: DISCONTINUED | OUTPATIENT
Start: 2021-11-23 | End: 2021-11-24 | Stop reason: HOSPADM

## 2021-11-22 RX ORDER — MAGNESIUM SULFATE 1 G/100ML
1000 INJECTION INTRAVENOUS PRN
Status: DISCONTINUED | OUTPATIENT
Start: 2021-11-22 | End: 2021-11-24 | Stop reason: HOSPADM

## 2021-11-22 RX ORDER — POLYETHYLENE GLYCOL 3350 17 G/17G
17 POWDER, FOR SOLUTION ORAL DAILY PRN
Status: DISCONTINUED | OUTPATIENT
Start: 2021-11-22 | End: 2021-11-24 | Stop reason: HOSPADM

## 2021-11-22 RX ORDER — PANTOPRAZOLE SODIUM 40 MG/1
40 TABLET, DELAYED RELEASE ORAL
Status: DISCONTINUED | OUTPATIENT
Start: 2021-11-23 | End: 2021-11-24 | Stop reason: HOSPADM

## 2021-11-22 RX ORDER — POTASSIUM CHLORIDE 20 MEQ/1
40 TABLET, EXTENDED RELEASE ORAL PRN
Status: DISCONTINUED | OUTPATIENT
Start: 2021-11-22 | End: 2021-11-24 | Stop reason: HOSPADM

## 2021-11-22 RX ORDER — GUAIFENESIN/DEXTROMETHORPHAN 100-10MG/5
5 SYRUP ORAL EVERY 4 HOURS PRN
Status: DISCONTINUED | OUTPATIENT
Start: 2021-11-22 | End: 2021-11-24 | Stop reason: HOSPADM

## 2021-11-22 RX ORDER — IBUPROFEN 600 MG/1
600 TABLET ORAL EVERY 6 HOURS PRN
Status: DISCONTINUED | OUTPATIENT
Start: 2021-11-22 | End: 2021-11-24 | Stop reason: HOSPADM

## 2021-11-22 RX ORDER — ACETAMINOPHEN 650 MG/1
650 SUPPOSITORY RECTAL EVERY 6 HOURS PRN
Status: DISCONTINUED | OUTPATIENT
Start: 2021-11-22 | End: 2021-11-24 | Stop reason: HOSPADM

## 2021-11-22 RX ORDER — BENZONATATE 100 MG/1
100 CAPSULE ORAL 3 TIMES DAILY PRN
Status: DISCONTINUED | OUTPATIENT
Start: 2021-11-22 | End: 2021-11-24 | Stop reason: HOSPADM

## 2021-11-22 RX ORDER — ACETAMINOPHEN 325 MG/1
650 TABLET ORAL EVERY 6 HOURS PRN
Status: DISCONTINUED | OUTPATIENT
Start: 2021-11-22 | End: 2021-11-24 | Stop reason: HOSPADM

## 2021-11-22 RX ORDER — POTASSIUM CHLORIDE 7.45 MG/ML
10 INJECTION INTRAVENOUS PRN
Status: DISCONTINUED | OUTPATIENT
Start: 2021-11-22 | End: 2021-11-24 | Stop reason: HOSPADM

## 2021-11-22 RX ORDER — SODIUM CHLORIDE 9 MG/ML
25 INJECTION, SOLUTION INTRAVENOUS PRN
Status: DISCONTINUED | OUTPATIENT
Start: 2021-11-22 | End: 2021-11-24 | Stop reason: HOSPADM

## 2021-11-22 RX ORDER — ATORVASTATIN CALCIUM 10 MG/1
20 TABLET, FILM COATED ORAL DAILY
Status: DISCONTINUED | OUTPATIENT
Start: 2021-11-22 | End: 2021-11-24 | Stop reason: HOSPADM

## 2021-11-22 RX ORDER — ONDANSETRON 4 MG/1
4 TABLET, ORALLY DISINTEGRATING ORAL EVERY 8 HOURS PRN
Status: DISCONTINUED | OUTPATIENT
Start: 2021-11-22 | End: 2021-11-24 | Stop reason: HOSPADM

## 2021-11-22 RX ORDER — SODIUM CHLORIDE 0.9 % (FLUSH) 0.9 %
5-40 SYRINGE (ML) INJECTION EVERY 12 HOURS SCHEDULED
Status: DISCONTINUED | OUTPATIENT
Start: 2021-11-22 | End: 2021-11-24 | Stop reason: HOSPADM

## 2021-11-22 RX ORDER — SODIUM CHLORIDE 0.9 % (FLUSH) 0.9 %
5-40 SYRINGE (ML) INJECTION PRN
Status: DISCONTINUED | OUTPATIENT
Start: 2021-11-22 | End: 2021-11-24 | Stop reason: HOSPADM

## 2021-11-22 RX ORDER — ONDANSETRON 2 MG/ML
4 INJECTION INTRAMUSCULAR; INTRAVENOUS EVERY 6 HOURS PRN
Status: DISCONTINUED | OUTPATIENT
Start: 2021-11-22 | End: 2021-11-24 | Stop reason: HOSPADM

## 2021-11-22 RX ORDER — AZITHROMYCIN 250 MG/1
500 TABLET, FILM COATED ORAL ONCE
Status: COMPLETED | OUTPATIENT
Start: 2021-11-22 | End: 2021-11-22

## 2021-11-22 RX ORDER — POTASSIUM CHLORIDE 20 MEQ/1
40 TABLET, EXTENDED RELEASE ORAL ONCE
Status: COMPLETED | OUTPATIENT
Start: 2021-11-22 | End: 2021-11-22

## 2021-11-22 RX ADMIN — SODIUM CHLORIDE, PRESERVATIVE FREE 10 ML: 5 INJECTION INTRAVENOUS at 23:25

## 2021-11-22 RX ADMIN — POTASSIUM CHLORIDE 40 MEQ: 1500 TABLET, EXTENDED RELEASE ORAL at 13:21

## 2021-11-22 RX ADMIN — DEXAMETHASONE SODIUM PHOSPHATE 8 MG: 10 INJECTION INTRAMUSCULAR; INTRAVENOUS at 12:13

## 2021-11-22 RX ADMIN — ENOXAPARIN SODIUM 30 MG: 100 INJECTION SUBCUTANEOUS at 22:43

## 2021-11-22 RX ADMIN — ATORVASTATIN CALCIUM 20 MG: 10 TABLET, FILM COATED ORAL at 22:43

## 2021-11-22 RX ADMIN — GUAIFENESIN AND DEXTROMETHORPHAN 5 ML: 100; 10 SYRUP ORAL at 22:43

## 2021-11-22 RX ADMIN — AZITHROMYCIN 500 MG: 250 TABLET, FILM COATED ORAL at 13:21

## 2021-11-22 RX ADMIN — CEFTRIAXONE SODIUM 1000 MG: 1 INJECTION, POWDER, FOR SOLUTION INTRAMUSCULAR; INTRAVENOUS at 13:25

## 2021-11-22 ASSESSMENT — ENCOUNTER SYMPTOMS
COUGH: 1
VOICE CHANGE: 0
TROUBLE SWALLOWING: 0
SHORTNESS OF BREATH: 0
BACK PAIN: 0
VOMITING: 0
RHINORRHEA: 1
SORE THROAT: 0
ABDOMINAL PAIN: 0
EYE PAIN: 0
BLOOD IN STOOL: 0
NAUSEA: 0
DIARRHEA: 0

## 2021-11-22 NOTE — ED PROVIDER NOTES
I independently examined and evaluated [employer]. In brief, this 76-year-old male is presenting with URI symptoms for the last week. He complains of dyspnea, cough, generalized fatigue. He also endorses shortness of breath, nausea. Denies chest pain. Focused exam revealed   General: Alert, no acute distress, patient resting comfortably   Skin: warm, intact, no pallor noted   Head: Normocephalic, atraumatic   Eye: Normal conjunctiva   Cardiac: Normal peripheral perfusion  Respiratory: No acute distress   Musculoskeletal: No deformity, full ROM. Neurological: alert and oriented, normal sensory and motor observed. Psychiatric: Cooperative    ED course: Presenting with viral syndrome, Covid positive. X-ray supports Covid diagnosis. Mild desaturations improved with oxygen by nasal cannula. Treated with dexamethasone and antibiotics due to possible bacterial pneumonia. Admitted for further treatment. ECG    The Ekg interpreted by me shows sinus rhythm with a rate of 73 bpm.  Normal axis. Nonspecific ST abnormality in V3 and V4. No ST elevations or depressions. , QRS 84, QTc 467. All diagnostic, treatment, and disposition decisions were made by myself in conjunction with the advanced practice provider. For all further details of the patient's emergency department visit, please see the advanced practice provider's documentation. Comment: Please note this report has been produced using speech recognition software and may contain errors related to that system including errors in grammar, punctuation, and spelling, as well as words and phrases that may be inappropriate. If there are any questions or concerns please feel free to contact the dictating provider for clarification.        Deepika Urena, DO  11/22/21 2974

## 2021-11-22 NOTE — ED PROVIDER NOTES
[address] ED  EMERGENCY DEPARTMENT ENCOUNTER        Pt Name: Quincy Hillman  MRN: [id number]  [place] 1965  Date of evaluation: 11/22/2021  Provider: ANTONIO Carrizales - VELMA  PCP: Carolina Do MD  Note Started: 10:53 AM EST      BUTCH. I have evaluated this patient. My supervising physician was available for consultation. Triage CHIEF COMPLAINT       Chief Complaint   Patient presents with    Cough     Patient arrives via EMS for cough. Patient was seen by PCP and put on amoxicillin. Patient states he is not feeling better.  Fatigue         HISTORY OF PRESENT ILLNESS   (Location/Symptom, Timing/Onset, Context/Setting, Quality, Duration, Modifying Factors, Severity)  Note limiting factors. Chief Complaint: Susi Hillman is a 55 y.o. male who presents to the emergency department with symptoms of cough congestion for the last 2 weeks. Patient states that he has had symptoms for approximately 14 days. No symptoms of chest pain or back pain. No shortness of breath. He reports a intermittent dry cough with productive clear sputum. States that his wife is also here in emergency department with similar type symptoms. Denies any symptoms of vomiting. He states he been able to hold down p.o. nutrition. He states he does have some mild body aches. Denies any other acute complaints at this time. He went to see his family doctor and was seen via telehealth visit and was prescribed amoxicillin for his symptoms on Friday. States he is taken the antibiotic through the weekend without any relief. Denies any pain at this time. Denies any other acute complaints. Nursing Notes were all reviewed and agreed with or any disagreements were addressed in the HPI. REVIEW OF SYSTEMS    (2-9 systems for level 4, 10 or more for level 5)     Review of Systems   Constitutional: Negative for chills, diaphoresis and fever. HENT: Positive for congestion and rhinorrhea. Negative for ear pain, sore throat, trouble swallowing and voice change. Eyes: Negative for pain and visual disturbance. Respiratory: Positive for cough. Negative for shortness of breath. Cardiovascular: Negative for chest pain and leg swelling. Gastrointestinal: Negative for abdominal pain, blood in stool, diarrhea, nausea and vomiting. Genitourinary: Negative for difficulty urinating, dysuria, flank pain and frequency. Musculoskeletal: Negative for back pain and neck pain. Skin: Negative for rash and wound. Neurological: Negative for dizziness and light-headedness. PAST MEDICAL HISTORY     Past Medical History:   Diagnosis Date    Chronic back pain     L2 or L4  problems, few times in past few years    COVID 11/22/2021    Hyperlipidemia     No history of procedure        SURGICAL HISTORY     Past Surgical History:   Procedure Laterality Date    COLONOSCOPY  11/14/2016    polyp transverse colon    KNEE ARTHROSCOPY      KNEE SURGERY      VASECTOMY         CURRENTMEDICATIONS       Previous Medications    ATORVASTATIN (LIPITOR) 20 MG TABLET    Take 20 mg by mouth daily    BIOTIN 1 MG CAPS    Take 1 tablet by mouth daily Indications: pt unsure of dose    HYDROCHLOROTHIAZIDE (HYDRODIURIL) 25 MG TABLET        IBUPROFEN (ADVIL;MOTRIN) 600 MG TABLET    Take 1 tablet by mouth every 6 hours as needed for Pain    OMEPRAZOLE (PRILOSEC) 20 MG CAPSULE           ALLERGIES     Codeine    FAMILYHISTORY     History reviewed. No pertinent family history.      SOCIAL HISTORY       Social History     Socioeconomic History    Marital status:      Spouse name: None    Number of children: None    Years of education: None    Highest education level: None   Occupational History    None   Tobacco Use    Smoking status: Never Smoker    Smokeless tobacco: Never Used   Substance and Sexual Activity    Alcohol use: Not Currently     Comment: couple times year    Drug use: Never    Sexual activity: None   Other Topics Concern    None   Social History Narrative    None     Social Determinants of Health     Financial Resource Strain:     Difficulty of Paying Living Expenses: Not on file   Food Insecurity:     Worried About Running Out of Food in the Last Year: Not on file    Carole of Food in the Last Year: Not on file   Transportation Needs:     Lack of Transportation (Medical): Not on file    Lack of Transportation (Non-Medical): Not on file   Physical Activity:     Days of Exercise per Week: Not on file    Minutes of Exercise per Session: Not on file   Stress:     Feeling of Stress : Not on file   Social Connections:     Frequency of Communication with Friends and Family: Not on file    Frequency of Social Gatherings with Friends and Family: Not on file    Attends [employer] Services: Not on file    Active Member of [address] [place] [address] [employer] or Organizations: Not on file    Attends Club or Organization Meetings: Not on file    Marital Status: Not on file   Intimate Partner Violence:     Fear of Current or Ex-Partner: Not on file    Emotionally Abused: Not on file    Physically Abused: Not on file    Sexually Abused: Not on file   Housing Stability:     Unable to Pay for Housing in the Last Year: Not on file    Number of [place] in the Last Year: Not on file    Unstable Housing in the Last Year: Not on file       SCREENINGS    [place] Coma Scale  Eye Opening: Spontaneous  Best Verbal Response: Oriented  Best Motor Response: Obeys commands  [place] Coma Scale Score: 15        PHYSICAL EXAM    (up to 7 for level 4, 8 or more for level 5)     ED Triage Vitals [11/22/21 0947]   BP Temp Temp Source Pulse Resp SpO2 Height Weight   (!) 140/83 97.9 °F (36.6 °C) Temporal 77 16 96 % 5' 9\" (1.753 m) 168 lb (76.2 kg)       Physical Exam  Vitals and nursing note reviewed. Constitutional:       Appearance: Normal appearance. He is normal weight. He is not ill-appearing, toxic-appearing or diaphoretic.    HENT: Head: Normocephalic and atraumatic. Nose: Nose normal.   Eyes:      General:         Right eye: No discharge. Left eye: No discharge. Cardiovascular:      Rate and Rhythm: Normal rate and regular rhythm. Pulses: Normal pulses. Heart sounds: Normal heart sounds. Pulmonary:      Effort: Pulmonary effort is normal. No respiratory distress. Breath sounds: Normal breath sounds. No wheezing or rhonchi. Abdominal:      General: Abdomen is flat. Tenderness: There is no abdominal tenderness. There is no rebound. Musculoskeletal:         General: No tenderness. Normal range of motion. Cervical back: Normal range of motion and neck supple. No rigidity or tenderness. Skin:     General: Skin is warm and dry. Capillary Refill: Capillary refill takes less than 2 seconds. Neurological:      General: No focal deficit present. Mental Status: He is alert and oriented to person, place, and time.       Gait: Gait normal.   Psychiatric:         Mood and Affect: Mood normal.         Behavior: Behavior normal.         DIAGNOSTIC RESULTS   LABS:    Labs Reviewed   COVID-19 & INFLUENZA COMBO - Abnormal; Notable for the following components:       Result Value    SARS-CoV-2 RNA, RT PCR DETECTED (*)     All other components within normal limits    Narrative:     Performed at:  [facility] [address],  [place], [facility]   Phone [phone]   CBC WITH AUTO DIFFERENTIAL - Abnormal; Notable for the following components:    WBC 13.2 (*)     Platelets 880 (*)     Neutrophils Absolute 11.4 (*)     Lymphocytes Absolute 0.6 (*)     Basophils Absolute 0.3 (*)     All other components within normal limits    Narrative:     Performed at:  [facility]) - [facility] [address],  [place], [facility]   Phone [phone]   COMPREHENSIVE METABOLIC PANEL - Abnormal; Notable for the following components:    Potassium 3.1 (*) Chloride 93 (*)     Glucose 105 (*)     CREATININE 0.8 (*)     Albumin/Globulin Ratio 1.0 (*)     All other components within normal limits    Narrative:     Performed at:  [facility] [address],  [place], [facility]   Phone [phone]   BLOOD GAS, VENOUS - Abnormal; Notable for the following components:    pO2, Esteban 40.2 (*)     HCO3, Venous 32.5 (*)     Base Excess, Esteban 7.0 (*)     All other components within normal limits    Narrative:     Performed at:  [facility] [address],  [place], [facility]   Phone [phone]   CULTURE, BLOOD 1   CULTURE, BLOOD 2   LACTATE, SEPSIS    Narrative:     Performed at:  [place] [address],  [place], [facility]   Phone [phone]   TROPONIN    Narrative:     Performed at:  [place] [address],  [place], [facility]   Phone [phone]   LACTATE, SEPSIS   C-REACTIVE PROTEIN       When ordered, only abnormal lab results are displayed. All other labs were within normal range or not returned as of this dictation. EKG: When ordered, EKG's are interpreted by the Emergency Department Physician in the absence of a cardiologist.  Please see their note for interpretation of EKG. RADIOLOGY:   Non-plain film images such as CT, Ultrasound and MRI are read by the radiologist. Plain radiographic images are visualized andpreliminarily interpreted by the  ED Provider with the below findings:        Interpretation perthe Radiologist below, if available at the time of this note:    XR CHEST PORTABLE   Final Result   Multifocal airspace disease. This pattern may reflect COVID pneumonia in the   correct clinical setting. No results found.       PROCEDURES   Unless otherwise noted below, none     Procedures    CRITICAL CARE TIME   N/A    CONSULTS:  IP CONSULT TO HOSPITALIST      EMERGENCY DEPARTMENT COURSE and DIFFERENTIAL DIAGNOSIS/MDM:   Vitals:    Vitals:    11/22/21 1400 11/22/21 1430 11/22/21 1500 11/22/21 1647   BP: 121/82 123/81 123/76 120/81   Pulse: 75 74 78 84   Resp: 19 24 24 27   Temp:    98.7 °F (37.1 °C)   TempSrc:    Oral   SpO2: 98% 96% 96% 94%   Weight:       Height:           Patient was given thefollowing medications:  Medications   cefTRIAXone (ROCEPHIN) 1000 mg IVPB in 50 mL D5W minibag (0 mg IntraVENous Stopped 11/22/21 1512)   dexamethasone (DECADRON) injection 8 mg (8 mg IntraVENous Given 11/22/21 1213)   potassium chloride (KLOR-CON M) extended release tablet 40 mEq (40 mEq Oral Given 11/22/21 1321)   azithromycin (ZITHROMAX) tablet 500 mg (500 mg Oral Given 11/22/21 1321)           Patient to be admitted to the hospitalist service. I had a detailed discussion with the hospitalist and patient is being evaluated for admission. Currently patient is a border here in the emergency department. Patient stable. Apparently while ambulating the patient's oxygen saturation drifted under 90%. There is concern the patient was 88 to 89% with ambulation. Was a reason for the patient's admission. He is also Covid - 19 positive. FINAL IMPRESSION      1. COVID-19    2. Hypoxia          DISPOSITION/PLAN   DISPOSITION Admitted 11/22/2021 01:11:04 PM      PATIENT REFERREDTO:  No follow-up provider specified.     DISCHARGE MEDICATIONS:  New Prescriptions    No medications on file       DISCONTINUED MEDICATIONS:  Discontinued Medications    No medications on file              (Please note that portions ofthis note were completed with a voice recognition program.  Efforts were made to edit the dictations but occasionally words are mis-transcribed.)    ANTONIO Webber CNP (electronically signed)            ANTONIO Webber CNP  11/22/21 1801

## 2021-11-22 NOTE — H&P
Hospital Medicine History & Physical      PCP: Lizbeth Gamez MD    Date of Admission: 11/22/2021    Date of Service: Pt seen/examined on 11/22/21      Chief Complaint:    Chief Complaint   Patient presents with    Cough     Patient arrives via EMS for cough. Patient was seen by PCP and put on amoxicillin. Patient states he is not feeling better.  Fatigue         History Of Present Illness: The patient is a 55 y.o. male with PMH of chronic back pain, HTN, andd  HLD who presents to [facility] with complaints of cough, congestion for the last 2 weeks. History obtained from the patient and review of EMR. Pt stated both him and his wife have been sick for over 2 weeks. He stated he started to feel better after a week and was started on Amoxicillin and remained with cough and fever at home. Over the last week he has been nauseated and had 2 episodes of diarrhea over the last 3 days. He stated he was not feeling better and his cough has been ongoing and makes his throat tickle. He does have some body aches  He stated that they placed him on oxygen in ED due to his coughing spells. He stated that he thought he had a cold then the flu. Pt did desat in the ED with ambulation. Pt is not vaccinated and he is COVID positive. Pt and wife is admitted for COVID and currently in the same room. In ED CXR airspace diease reflected  COVID PNA. Pt was COVID positive. He did desat with ambulation in ED. EKG showed some baseline artifact and showed nonspecific ST abnormality, prolonged QTc. Troponin 0.01. Denies any chest pain. Admit to 2W with continuous pulse ox.     Past Medical History:        Diagnosis Date    Chronic back pain     L2 or L4  problems, few times in past few years    COVID 11/22/2021    Hyperlipidemia     No history of procedure        Past Surgical History:        Procedure Laterality Date    COLONOSCOPY  11/14/2016    polyp transverse colon    KNEE ARTHROSCOPY      KNEE SURGERY  VASECTOMY         Medications Prior to Admission:    Prior to Admission medications    Medication Sig Start Date End Date Taking? Authorizing Provider   hydroCHLOROthiazide (HYDRODIURIL) 25 MG tablet  7/27/21  Yes Historical Provider, MD   atorvastatin (LIPITOR) 20 MG tablet Take 20 mg by mouth daily   Yes Historical Provider, MD   Biotin 1 MG CAPS Take 1 tablet by mouth daily Indications: pt unsure of dose   Yes Historical Provider, MD   ibuprofen (ADVIL;MOTRIN) 600 MG tablet Take 1 tablet by mouth every 6 hours as needed for Pain 4/29/19  Yes Kathryn Guerrero PA-C   omeprazole (PRILOSEC) 20 MG capsule  12/26/14  Yes Historical Provider, MD       Allergies:  Codeine    Social History:  The patient currently lives home     TOBACCO:   reports that he has never smoked. He has never used smokeless tobacco.  ETOH:   reports previous alcohol use. Family History:   Positive as follows:    History reviewed. No pertinent family history. REVIEW OF SYSTEMS:       Constitutional: Negative for fever   HENT: Negative for sore throat   Eyes: Negative for redness   Respiratory: + dyspnea +cough   Cardiovascular: Negative for chest pain   Gastrointestinal: +vomiting + diarrhea + nausea   Genitourinary: Negative for hematuria   Musculoskeletal: Negative for arthralgias   Skin: Negative for rash   Neurological: Negative for syncope   Hematological: Negative for adenopathy   Psychiatric/Behavorial: Negative for anxiety    PHYSICAL EXAM:    /81   Pulse 84   Temp 98.7 °F (37.1 °C) (Oral)   Resp 27   Ht 5' 9\" (1.753 m)   Wt 168 lb (76.2 kg)   SpO2 94%   BMI 24.81 kg/m²     Gen: No distress. Alert. Eyes: PERRL. No sclera icterus. No conjunctival injection. ENT: No discharge. Pharynx clear. Neck: No JVD. No Carotid Bruit. Trachea midline. Resp: No accessory muscle use. No crackles. No wheezes. No rhonchi. CV: Regular rate. Regular rhythm. No murmur. No rub. No edema. GI: Non-tender. Non-distended.  No masses. No organomegaly. Normal bowel sounds. No hernia. Skin: Warm and dry. No nodule on exposed extremities. No rash on exposed extremities. M/S: No cyanosis. No joint deformity. No clubbing. Neuro: Awake. Grossly nonfocal    Psych: Oriented x 3. No anxiety or agitation. CBC:   Recent Labs     11/22/21  1135   WBC 13.2*   HGB 14.2   HCT 42.5   MCV 81.8   *     BMP:   Recent Labs     11/22/21  1135      K 3.1*   CL 93*   CO2 29   BUN 13   CREATININE 0.8*     LIVER PROFILE:   Recent Labs     11/22/21  1135   AST 28   ALT 36   BILITOT 0.5   ALKPHOS 106          CARDIAC ENZYMES  Recent Labs     11/22/21  1135   TROPONINI <0.01         CULTURES   SARS-CoV-2 RNA, RT PCR DETECTED          EKG:    Normal sinus rhythmNonspecific ST abnormalityBaseline artifactT wave abnormality, consider inferior ischemiaProlonged QTAbnormal ECGno previous tracing for comparison. Confirmed by Xochitl Echeverria MD, [address] ([id number]) on 11/22/2021 5:51:30 PM    RADIOLOGY  XR CHEST PORTABLE   Final Result   Multifocal airspace disease. This pattern may reflect COVID pneumonia in the   correct clinical setting. Active Problems:    Pneumonia due to COVID-19 virus  Resolved Problems:    * No resolved hospital problems.  *        ASSESSMENT/PLAN:  COVID PNA  Acute Hypoxic Respiratory Failure   N/V/D  - CXR: with multifocal airspace disease, pattern reflects COVID PNA  - no home O2 at baseline  - pt is unvaccinated  - Admitted to 2W, droplet +, tele, cont pulse ox  - supp O2, wean as tolerated - 2L oxygen 94%  - Decadron D#1,   PRN Robitussin  - WBC 13.2 - add procal   - given Zithromax and rocephin in ED, hold off on antibiotics at this time     Abnormal EKG  - denies chest pain   - troponin <0.01-- trend  - EKG noted with baseline artifact T wave abnormality, consider inferior ischemia and prolonged Qtc  - repeat EKG am     Hypokalemia  - likely 2/ COVID plus N/D  - 3.1  - replaced in ED  - monitor     HTN  - controlled

## 2021-11-23 LAB
A/G RATIO: 0.9 (ref 1.1–2.2)
ALBUMIN SERPL-MCNC: 3.4 G/DL (ref 3.4–5)
ALP BLD-CCNC: 92 U/L (ref 40–129)
ALT SERPL-CCNC: 39 U/L (ref 10–40)
ANION GAP SERPL CALCULATED.3IONS-SCNC: 12 MMOL/L (ref 3–16)
AST SERPL-CCNC: 26 U/L (ref 15–37)
BASOPHILS ABSOLUTE: 0 K/UL (ref 0–0.2)
BASOPHILS RELATIVE PERCENT: 0.1 %
BILIRUB SERPL-MCNC: 0.3 MG/DL (ref 0–1)
BUN BLDV-MCNC: 17 MG/DL (ref 7–20)
CALCIUM SERPL-MCNC: 8.8 MG/DL (ref 8.3–10.6)
CHLORIDE BLD-SCNC: 96 MMOL/L (ref 99–110)
CO2: 26 MMOL/L (ref 21–32)
CREAT SERPL-MCNC: 0.7 MG/DL (ref 0.9–1.3)
EKG ATRIAL RATE: 67 BPM
EKG DIAGNOSIS: NORMAL
EKG P AXIS: 46 DEGREES
EKG P-R INTERVAL: 140 MS
EKG Q-T INTERVAL: 448 MS
EKG QRS DURATION: 90 MS
EKG QTC CALCULATION (BAZETT): 473 MS
EKG R AXIS: 38 DEGREES
EKG T AXIS: -22 DEGREES
EKG VENTRICULAR RATE: 67 BPM
EOSINOPHILS ABSOLUTE: 0 K/UL (ref 0–0.6)
EOSINOPHILS RELATIVE PERCENT: 0 %
GFR AFRICAN AMERICAN: >60
GFR NON-AFRICAN AMERICAN: >60
GLUCOSE BLD-MCNC: 151 MG/DL (ref 70–99)
HCT VFR BLD CALC: 40.2 % (ref 40.5–52.5)
HEMOGLOBIN: 13.2 G/DL (ref 13.5–17.5)
LYMPHOCYTES ABSOLUTE: 1 K/UL (ref 1–5.1)
LYMPHOCYTES RELATIVE PERCENT: 9.1 %
MAGNESIUM: 2.3 MG/DL (ref 1.8–2.4)
MCH RBC QN AUTO: 27.1 PG (ref 26–34)
MCHC RBC AUTO-ENTMCNC: 32.9 G/DL (ref 31–36)
MCV RBC AUTO: 82.5 FL (ref 80–100)
MONOCYTES ABSOLUTE: 0.9 K/UL (ref 0–1.3)
MONOCYTES RELATIVE PERCENT: 8.4 %
NEUTROPHILS ABSOLUTE: 8.6 K/UL (ref 1.7–7.7)
NEUTROPHILS RELATIVE PERCENT: 82.4 %
PDW BLD-RTO: 13.9 % (ref 12.4–15.4)
PLATELET # BLD: 434 K/UL (ref 135–450)
PMV BLD AUTO: 7.1 FL (ref 5–10.5)
POTASSIUM REFLEX MAGNESIUM: 3.2 MMOL/L (ref 3.5–5.1)
RBC # BLD: 4.87 M/UL (ref 4.2–5.9)
SODIUM BLD-SCNC: 134 MMOL/L (ref 136–145)
TOTAL PROTEIN: 7.1 G/DL (ref 6.4–8.2)
TROPONIN: <0.01 NG/ML
WBC # BLD: 10.4 K/UL (ref 4–11)

## 2021-11-23 PROCEDURE — 1200000000 HC SEMI PRIVATE

## 2021-11-23 PROCEDURE — 93005 ELECTROCARDIOGRAM TRACING: CPT | Performed by: NURSE PRACTITIONER

## 2021-11-23 PROCEDURE — 84484 ASSAY OF TROPONIN QUANT: CPT

## 2021-11-23 PROCEDURE — 80053 COMPREHEN METABOLIC PANEL: CPT

## 2021-11-23 PROCEDURE — 6360000002 HC RX W HCPCS: Performed by: PHYSICIAN ASSISTANT

## 2021-11-23 PROCEDURE — 93010 ELECTROCARDIOGRAM REPORT: CPT | Performed by: INTERNAL MEDICINE

## 2021-11-23 PROCEDURE — 99232 SBSQ HOSP IP/OBS MODERATE 35: CPT | Performed by: INTERNAL MEDICINE

## 2021-11-23 PROCEDURE — 36415 COLL VENOUS BLD VENIPUNCTURE: CPT

## 2021-11-23 PROCEDURE — 99255 IP/OBS CONSLTJ NEW/EST HI 80: CPT | Performed by: INTERNAL MEDICINE

## 2021-11-23 PROCEDURE — 6370000000 HC RX 637 (ALT 250 FOR IP): Performed by: PHYSICIAN ASSISTANT

## 2021-11-23 PROCEDURE — 2700000000 HC OXYGEN THERAPY PER DAY

## 2021-11-23 PROCEDURE — 83735 ASSAY OF MAGNESIUM: CPT

## 2021-11-23 PROCEDURE — 85025 COMPLETE CBC W/AUTO DIFF WBC: CPT

## 2021-11-23 PROCEDURE — 2580000003 HC RX 258: Performed by: PHYSICIAN ASSISTANT

## 2021-11-23 PROCEDURE — 94761 N-INVAS EAR/PLS OXIMETRY MLT: CPT

## 2021-11-23 RX ADMIN — ATORVASTATIN CALCIUM 20 MG: 10 TABLET, FILM COATED ORAL at 09:00

## 2021-11-23 RX ADMIN — POTASSIUM CHLORIDE 40 MEQ: 1500 TABLET, EXTENDED RELEASE ORAL at 11:09

## 2021-11-23 RX ADMIN — GUAIFENESIN AND DEXTROMETHORPHAN 5 ML: 100; 10 SYRUP ORAL at 20:39

## 2021-11-23 RX ADMIN — ENOXAPARIN SODIUM 30 MG: 100 INJECTION SUBCUTANEOUS at 09:00

## 2021-11-23 RX ADMIN — SODIUM CHLORIDE, PRESERVATIVE FREE 10 ML: 5 INJECTION INTRAVENOUS at 21:29

## 2021-11-23 RX ADMIN — HYDROCHLOROTHIAZIDE 25 MG: 25 TABLET ORAL at 09:00

## 2021-11-23 RX ADMIN — DEXAMETHASONE 6 MG: 1 TABLET ORAL at 09:00

## 2021-11-23 RX ADMIN — PANTOPRAZOLE SODIUM 40 MG: 40 TABLET, DELAYED RELEASE ORAL at 09:05

## 2021-11-23 RX ADMIN — GUAIFENESIN AND DEXTROMETHORPHAN 5 ML: 100; 10 SYRUP ORAL at 09:00

## 2021-11-23 ASSESSMENT — PAIN SCALES - GENERAL
PAINLEVEL_OUTOF10: 0
PAINLEVEL_OUTOF10: 0

## 2021-11-23 NOTE — FLOWSHEET NOTE
11/23/21 0805   Vital Signs   Temp 97 °F (36.1 °C)   Temp Source Oral   Pulse 73   Heart Rate Source Monitor   Resp 20   /79   BP Location Right upper arm   Patient Position Semi fowlers   Level of Consciousness Alert (0)   MEWS Score 1   Patient Currently in Pain Denies   Oxygen Therapy   SpO2 95 %   O2 Device Nasal cannula   O2 Flow Rate (L/min) 3 L/min     Patient alert and oriented, denies any complains of. Lung sounds diminished. SpO2= 98% on 3 L/min. Tele = NSR. Bed in low position, call bell within reach. Bed alarm on. Will continue to monitor.

## 2021-11-23 NOTE — PROGRESS NOTES
Patient to Room 208-1 at this time from ER. Patient alert and oriented x 4. Placed in bed, oriented to call light. Call light within reach. Needs are met. Pain denied. Patient on 2L o2 at this time; tolerating well. Oriented to room. In bed, call light within reach.

## 2021-11-23 NOTE — PROGRESS NOTES
Admit: 2021    Name:  Vinnie Mcallister  Room:  0208/0208-01  MRN:    [id number]     Daily Progress Note for 2021   Admitted with COVID-19 pneumonia and acute respiratory failure  Interval History:   Currently on 3 L of oxygen. Has a nonproductive cough. Scheduled Meds:   sodium chloride flush  5-40 mL IntraVENous 2 times per day    enoxaparin  30 mg SubCUTAneous BID    dexamethasone  6 mg Oral Daily    atorvastatin  20 mg Oral Daily    hydroCHLOROthiazide  25 mg Oral Daily    pantoprazole  40 mg Oral QAM AC       Continuous Infusions:   sodium chloride         PRN Meds:  sodium chloride flush, sodium chloride, ondansetron **OR** ondansetron, polyethylene glycol, acetaminophen **OR** acetaminophen, guaiFENesin-dextromethorphan, potassium chloride **OR** potassium alternative oral replacement **OR** potassium chloride, magnesium sulfate, ibuprofen, benzonatate                  Objective:     Temp  Av.2 °F (36.8 °C)  Min: 97 °F (36.1 °C)  Max: 99.1 °F (37.3 °C)  Pulse  Av.5  Min: 68  Max: 107  BP  Min: 120/81  Max: 146/83  SpO2  Av %  Min: 93 %  Max: 100 %  Patient Vitals for the past 4 hrs:   BP Temp Temp src Pulse Resp SpO2   21 0805 134/79 97 °F (36.1 °C) Oral 73 20 95 %   21 0449 125/81 98.3 °F (36.8 °C) Oral 95 20 95 %       No intake or output data in the 24 hours ending 21 0840    Physical Exam:    Gen: No distress. Alert. Eyes: PERRL. No sclera icterus. No conjunctival injection. ENT: No discharge. Pharynx clear. Neck: No JVD. No Carotid Bruit. Trachea midline. Resp: No accessory muscle use. No crackles. No wheezes. No rhonchi. CV: Regular rate. Regular rhythm. No murmur. No rub. No edema. GI: Non-tender. Non-distended. No masses. No organomegaly. Normal bowel sounds. No hernia. Skin: Warm and dry. No nodule on exposed extremities. No rash on exposed extremities. M/S: No cyanosis. No joint deformity. No clubbing. Neuro: Awake.  Grossly nonfocal    Psych: Oriented x 3. No anxiety or agitation. Lab Data:  CBC:   Recent Labs     11/22/21  1135 11/23/21  0548   WBC 13.2* 10.4   RBC 5.19 4.87   HGB 14.2 13.2*   HCT 42.5 40.2*   MCV 81.8 82.5   RDW 14.2 13.9   * 434     BMP:   Recent Labs     11/22/21  1135 11/23/21  0548    134*   K 3.1* 3.2*   CL 93* 96*   CO2 29 26   BUN 13 17   CREATININE 0.8* 0.7*     BNP: No results for input(s): BNP in the last 72 hours. PT/INR: No results for input(s): PROTIME, INR in the last 72 hours. APTT:No results for input(s): APTT in the last 72 hours. CARDIAC ENZYMES:   Recent Labs     11/22/21  1135 11/22/21 2010 11/23/21  0152   TROPONINI <0.01 <0.01 <0.01     FASTING LIPID PANEL:No results found for: CHOL, HDL, TRIG  LIVER PROFILE:   Recent Labs     11/22/21  1135 11/23/21  0548   AST 28 26   ALT 36 39   BILITOT 0.5 0.3   ALKPHOS 106 92      XR CHEST PORTABLE   Final Result   Multifocal airspace disease. This pattern may reflect COVID pneumonia in the   correct clinical setting. Assessment & Plan:     Patient Active Problem List    Diagnosis Date Noted    Pneumonia due to COVID-19 virus 11/22/2021    Supraspinatus sprain 01/12/2015       COVID PNA  Acute Hypoxic Respiratory Failure   N/V/D  - CXR: with multifocal airspace disease, pattern reflects COVID PNA  - no home O2 at baseline  - pt is unvaccinated  - Admitted to 2W, droplet +, tele, cont pulse ox  - supp O2, wean as tolerated - 2L oxygen 94%  - Decadron D#2,   PRN Robitussin  He is outside the window where remdesivir would be beneficial.  Pro-Christiano not elevated.     Abnormal EKG  - denies chest pain   - troponin <0.01-- trend  - EKG noted with baseline artifact T wave abnormality, consider inferior ischemia and prolonged Qtc  Might need an outpatient stress test.     Hypokalemia  - likely 2/ COVID plus N/D  - 3.1  - replaced in ED  - monitor      HTN  - controlled continue home meds:  HCTZ        HLD  - Continue statin    DVT Prophylaxis: Lovenox BID   Diet: ADULT DIET;  Regular  Code Status: No Order     Verona Avitia MD

## 2021-11-23 NOTE — FLOWSHEET NOTE
11/23/21 1315   Vital Signs   Temp 97 °F (36.1 °C)   Temp Source Oral   Pulse 73   Heart Rate Source Monitor   Resp 18   /71   BP Location Right upper arm   Patient Position Semi fowlers   Level of Consciousness Alert (0)   MEWS Score 1   Patient Currently in Pain Denies   Oxygen Therapy   SpO2 94 %   O2 Device Nasal cannula   O2 Flow Rate (L/min) 3 L/min     Patient sitting up in bed, talking on phone. Denies any complains of. VSS. Pt resting in bed quietly at this time. Denies any needs. All needs and call light within reach.

## 2021-11-23 NOTE — ACP (ADVANCE CARE PLANNING)
Advance Care Planning   Healthcare Decision Maker:    Primary Decision Maker: Leida Underwood - Spouse - [phone]    Secondary Decision Maker: Jerome Fee - [phone]    Spoke with pt via telephone, spouse is primary decision maker however she is also admitted and is now in ICU on vapotherm, pt would like his dtr Thierno Kumar as secondary decision maker. Click here to complete Healthcare Decision Makers including selection of the Healthcare Decision Maker Relationship (ie \"Primary\").

## 2021-11-23 NOTE — PLAN OF CARE
Problem: Airway Clearance - Ineffective  Goal: Achieve or maintain patent airway  11/23/2021 1016 by Karie Nguyen RN  Outcome: Ongoing  11/23/2021 0205 by Rahel Guajardo RN  Outcome: Ongoing  11/23/2021 0205 by Rahel Guajardo RN  Outcome: Ongoing     Problem: Gas Exchange - Impaired  Goal: Absence of hypoxia  11/23/2021 1016 by Karie Nguyen RN  Outcome: Ongoing  11/23/2021 0205 by Rahel Guajardo RN  Outcome: Ongoing  11/23/2021 0205 by Rahel Guajardo RN  Outcome: Ongoing  Goal: Promote optimal lung function  11/23/2021 1016 by Karie Nguyen RN  Outcome: Ongoing  11/23/2021 0205 by Rahel Guajardo RN  Outcome: Ongoing  11/23/2021 0205 by Rahel Guajardo RN  Outcome: Ongoing     Problem: Breathing Pattern - Ineffective  Goal: Ability to achieve and maintain a regular respiratory rate  11/23/2021 1016 by Karie Nguyen RN  Outcome: Ongoing  11/23/2021 0205 by Rahel Guajardo RN  Outcome: Ongoing  11/23/2021 0205 by Rahel Guajardo RN  Outcome: Ongoing     Problem:  Body Temperature -  Risk of, Imbalanced  Goal: Ability to maintain a body temperature within defined limits  11/23/2021 1016 by Karie Nguyen RN  Outcome: Ongoing  11/23/2021 0205 by Rahel Guajardo RN  Outcome: Ongoing  11/23/2021 0205 by Rahel Guajardo RN  Outcome: Ongoing  Goal: Will regain or maintain usual level of consciousness  11/23/2021 1016 by Karie Nguyen RN  Outcome: Ongoing  11/23/2021 0205 by Rahel Guajardo RN  Outcome: Ongoing  11/23/2021 0205 by Rahel Guajardo RN  Outcome: Ongoing  Goal: Complications related to the disease process, condition or treatment will be avoided or minimized  11/23/2021 1016 by Karie Nguyen RN  Outcome: Ongoing  11/23/2021 0205 by Rahel Guajardo RN  Outcome: Ongoing  11/23/2021 0205 by Rahel Guajardo RN  Outcome: Ongoing     Problem: Isolation Precautions - Risk of Spread of Infection  Goal: Prevent transmission of infection  11/23/2021 1016 by Horacio Fowler Kavon Browne RN  Outcome: Ongoing  11/23/2021 0205 by Nakita Zavala RN  Outcome: Ongoing  11/23/2021 0205 by Nakita Zavala RN  Outcome: Ongoing     Problem: Nutrition Deficits  Goal: Optimize nutritional status  11/23/2021 1016 by Fidel Ramírez RN  Outcome: Ongoing  11/23/2021 0205 by Nakita Zavala RN  Outcome: Ongoing  11/23/2021 0205 by Nakita Zavala RN  Outcome: Ongoing     Problem: Risk for Fluid Volume Deficit  Goal: Maintain normal heart rhythm  11/23/2021 1016 by Fidel Ramírez RN  Outcome: Ongoing  11/23/2021 0205 by Nakita Zavala RN  Outcome: Ongoing  11/23/2021 0205 by Nakita Zavala RN  Outcome: Ongoing  Goal: Maintain absence of muscle cramping  11/23/2021 1016 by Fidel Ramírez RN  Outcome: Ongoing  11/23/2021 0205 by Nakita Zavala RN  Outcome: Ongoing  11/23/2021 0205 by Nakita Zavala RN  Outcome: Ongoing  Goal: Maintain normal serum potassium, sodium, calcium, phosphorus, and pH  11/23/2021 1016 by Fidel Ramírez RN  Outcome: Ongoing  11/23/2021 0205 by Nakita Zavala RN  Outcome: Ongoing  11/23/2021 0205 by Nakita Zavala RN  Outcome: Ongoing     Problem: Loneliness or Risk for Loneliness  Goal: Demonstrate positive use of time alone when socialization is not possible  11/23/2021 1016 by Fidel Ramírez RN  Outcome: Ongoing  11/23/2021 0205 by Nakita Zavala RN  Outcome: Ongoing  11/23/2021 0205 by Nakita Zavala RN  Outcome: Ongoing     Problem: Fatigue  Goal: Verbalize increase energy and improved vitality  11/23/2021 1016 by Fidel Ramírez RN  Outcome: Ongoing  11/23/2021 0205 by Nakita Zavala RN  Outcome: Ongoing  11/23/2021 0205 by Nakita Zavala RN  Outcome: Ongoing     Problem: Patient Education: Go to Patient Education Activity  Goal: Patient/Family Education  11/23/2021 1016 by Fidel Ramírez RN  Outcome: Ongoing  11/23/2021 0205 by Nakita Zavala RN  Outcome: Ongoing  11/23/2021 0205 by Nakita Zavala RN  Outcome: Ongoing     Problem: Falls - Risk of:  Goal: Will remain free from falls  Outcome: Ongoing  Goal: Absence of physical injury  Outcome: Ongoing

## 2021-11-23 NOTE — CONSULTS
Reason for referral and CC: COVID-19 pneumonia in an unvaccinated individual    HISTORY OF PRESENT ILLNESS: 56 yo male with a h/ o asthma and chronic back pain that have complaints with increasing shortness of breath. He first had a cough and congestion and nausea and vomiting diarrhea 2 weeks ago. Over the last 3 days he became increasingly short of breath. He complains of coughing fits that are hard to control. He was found to be hypoxic and positive for Covid in the emergency room. He was admitted for further care. His wife is also in the hospital.    Past Medical History:   Diagnosis Date    Arthritis     Asthma     Chronic back pain     L2 or L4  problems, few times in past few years    COVID 11/22/2021    Hyperlipidemia     No history of procedure      Past Surgical History:   Procedure Laterality Date    COLONOSCOPY  11/14/2016    polyp transverse colon    JOINT REPLACEMENT      KNEE ARTHROSCOPY      KNEE SURGERY      VASECTOMY       Family History  family history is not on file. Social History:  reports that he has never smoked. He has never used smokeless tobacco.   reports previous alcohol use. ALLERGIES:  Patient is allergic to codeine.   Continuous Infusions:   sodium chloride       Scheduled Meds:   sodium chloride flush  5-40 mL IntraVENous 2 times per day    enoxaparin  30 mg SubCUTAneous BID    dexamethasone  6 mg Oral Daily    atorvastatin  20 mg Oral Daily    hydroCHLOROthiazide  25 mg Oral Daily    pantoprazole  40 mg Oral QAM AC     PRN Meds:  sodium chloride flush, sodium chloride, ondansetron **OR** ondansetron, polyethylene glycol, acetaminophen **OR** acetaminophen, guaiFENesin-dextromethorphan, potassium chloride **OR** potassium alternative oral replacement **OR** potassium chloride, magnesium sulfate, ibuprofen, benzonatate    REVIEW OF SYSTEMS:  Constitutional: Negative for fever  HENT: Negative for sore throat  Eyes: Negative for redness   Respiratory: + for dyspnea, cough  Cardiovascular: Negative for chest pain  Gastrointestinal: + for vomiting, diarrhea   Genitourinary: Negative for hematuria   Musculoskeletal: Negative for arthralgias   Skin: Negative for rash  Neurological: Negative for syncope  Hematological: Negative for adenopathy  Psychiatric/Behavorial: Negative for anxiety    PHYSICAL EXAM: /79   Pulse 73   Temp 97 °F (36.1 °C) (Oral)   Resp 20   Ht 5' 9\" (1.753 m)   Wt 185 lb (83.9 kg)   SpO2 95%   BMI 27.32 kg/m²  on 3 L  Constitutional:  No acute distress. Eyes: PERRL. Conjunctivae anicteric. ENT: Normal nose. Normal tongue. Neck:  Trachea is midline. No thyroid tenderness. Respiratory: No accessory muscle usage. No wheezes. No rales. No Rhonchi. Cardiovascular: Normal S1S2. No digit clubbing. No digit cyanosis. No LE edema. Capillary refill is normal.  Gastrointestinal: No mass palpated. No tenderness palpated. No umbilical hernia. Lymphatic: No cervical or supraclavicular adenopathy. Skin: No rash on the exposed extremities. No Nodules or induration on exposed extremities. Psychiatric: No anxiety or Agitation. Alert and Oriented to person, place and time. CBC:   Recent Labs     11/22/21  1135 11/23/21  0548   WBC 13.2* 10.4   HGB 14.2 13.2*   HCT 42.5 40.2*   MCV 81.8 82.5   * 434     BMP:   Recent Labs     11/22/21  1135 11/23/21  0548    134*   K 3.1* 3.2*   CL 93* 96*   CO2 29 26   BUN 13 17   CREATININE 0.8* 0.7*        Recent Labs     11/22/21  1135 11/23/21  0548   AST 28 26   ALT 36 39   BILITOT 0.5 0.3   ALKPHOS 106 92     No results for input(s): PROTIME, INR in the last 72 hours. No results for input(s): NITRITE, COLORU, PHUR, LABCAST, WBCUA, RBCUA, MUCUS, TRICHOMONAS, YEAST, BACTERIA, CLARITYU, SPECGRAV, LEUKOCYTESUR, UROBILINOGEN, BILIRUBINUR, BLOODU, GLUCOSEU, AMORPHOUS in the last 72 hours.     Invalid input(s): KETONESU  No results for input(s): PHART, PTK0NFW, PO2ART in the last 72 hours.    RADIOLOGY  XR CHEST PORTABLE   Final Result   Multifocal airspace disease. This pattern may reflect COVID pneumonia in the   correct clinical setting.          ASSESSMENT:  · COVID-19 pneumonia in an unvaccinated individual  · Acute hypoxic respiratory failure    PLAN:  Droplet Plus Airborne Precautions   Supplemental oxygen to keep saturation greater than 92%  Decadron 6 mg QD, D#2, monitor glucose closely  Albuterol MDI PRN  Robitussin-DM and Tessalon Perles  Lovenox twice daily    Thank you Letty Wynn* for this consult

## 2021-11-23 NOTE — PLAN OF CARE
Problem: Airway Clearance - Ineffective  Goal: Achieve or maintain patent airway  11/23/2021 0205 by Jennifer Franco RN  Outcome: Ongoing  11/23/2021 0205 by Jennifer Franco RN  Outcome: Ongoing     Problem: Gas Exchange - Impaired  Goal: Absence of hypoxia  11/23/2021 0205 by Jennifer Franco RN  Outcome: Ongoing  11/23/2021 0205 by Jennifer Franco RN  Outcome: Ongoing  Goal: Promote optimal lung function  11/23/2021 0205 by Jennifer Franco RN  Outcome: Ongoing  11/23/2021 0205 by Jennifer Franco RN  Outcome: Ongoing     Problem: Breathing Pattern - Ineffective  Goal: Ability to achieve and maintain a regular respiratory rate  11/23/2021 0205 by Jennifer Franco RN  Outcome: Ongoing  11/23/2021 0205 by Jennifer Franco RN  Outcome: Ongoing     Problem:  Body Temperature -  Risk of, Imbalanced  Goal: Ability to maintain a body temperature within defined limits  11/23/2021 0205 by Jennifer Franco RN  Outcome: Ongoing  11/23/2021 0205 by Jennifer Franco RN  Outcome: Ongoing  Goal: Will regain or maintain usual level of consciousness  11/23/2021 0205 by Jennifer Franco RN  Outcome: Ongoing  11/23/2021 0205 by Jennifer Franco RN  Outcome: Ongoing  Goal: Complications related to the disease process, condition or treatment will be avoided or minimized  11/23/2021 0205 by Jennifer Franco RN  Outcome: Ongoing  11/23/2021 0205 by Jennifer Franco RN  Outcome: Ongoing     Problem: Isolation Precautions - Risk of Spread of Infection  Goal: Prevent transmission of infection  11/23/2021 0205 by Jennifer Franco RN  Outcome: Ongoing  11/23/2021 0205 by Jennifer Franco RN  Outcome: Ongoing     Problem: Nutrition Deficits  Goal: Optimize nutritional status  11/23/2021 0205 by Jennifer Franco RN  Outcome: Ongoing  11/23/2021 0205 by Jennifer Franco RN  Outcome: Ongoing     Problem: Risk for Fluid Volume Deficit  Goal: Maintain normal heart rhythm  11/23/2021 0205 by Jennifer Franco RN  Outcome: Ongoing  11/23/2021 0205 by Divya Crowder Nasim Grey RN  Outcome: Ongoing  Goal: Maintain absence of muscle cramping  11/23/2021 0205 by Jennifer Alonzo RN  Outcome: Ongoing  11/23/2021 0205 by Jennifer Alonzo RN  Outcome: Ongoing  Goal: Maintain normal serum potassium, sodium, calcium, phosphorus, and pH  11/23/2021 0205 by Jennifer Alonzo RN  Outcome: Ongoing  11/23/2021 0205 by Jennifer Alonzo RN  Outcome: Ongoing     Problem: Loneliness or Risk for Loneliness  Goal: Demonstrate positive use of time alone when socialization is not possible  11/23/2021 0205 by Jennifer Alonzo RN  Outcome: Ongoing  11/23/2021 0205 by Jennifer Alonzo RN  Outcome: Ongoing     Problem: Fatigue  Goal: Verbalize increase energy and improved vitality  11/23/2021 0205 by Jennifer Alonzo RN  Outcome: Ongoing  11/23/2021 0205 by Jennifer Alonzo RN  Outcome: Ongoing     Problem: Patient Education: Go to Patient Education Activity  Goal: Patient/Family Education  11/23/2021 0205 by Jennifer Alonzo RN  Outcome: Ongoing  11/23/2021 0205 by Jennifer Alonzo RN  Outcome: Ongoing

## 2021-11-23 NOTE — CARE COORDINATION
Case Management Assessment  Initial Evaluation      Patient Name: Babak Underwood  YOB: 1965  Diagnosis: Hypoxia [R09.02]  Pneumonia due to COVID-19 virus [U07.1, J12.82]  COVID-19 [U07.1]  Date / Time: 11/22/2021 10:06 AM    Admission status/Date:11/22/2021  Chart Reviewed: Yes      Patient Interviewed: Yes  Family Interviewed:  No      Hospitalization in the last 30 days:  No      Health Care Decision Maker :   Primary Decision Maker: Leida Underwood - Spouse - [phone]    Secondary Decision Maker: Arsendeepak Mayra - Child - [phone]    (CM - must 1st enter selection under Navigator - emergency contact- [place] Relationship and pick relationship)   Who do you trust or have selected to make healthcare decisions for you      Met with: pt  Interview conducted  (bedside/phone):telephone    Current PCP:   [address] [place] [address] required for SNF : Y          3 night stay required - [employer]  Support Systems/Care Needs: Spouse/Significant Other  Transportation: self    Meal Preparation: self    Housing  Living Arrangements: lives in 2 story house with spouse  Steps: [facility] for return to present living arrangements: Yes  Identified Issues:     [address] with [address] [employer] [address] : No Agency:(Services)  Type of Home Care Services: None  Passport/Waiver : No  :                      Phone Number:    Passport/Waiver Services:           Durable Medical Equiptment   DME Provider: none  Equipment: none  Walker___Cane___RTS___ BSC___Shower Chair___Hospital Bed___W/C____Other________  02 at ____Liter(s)---wears(frequency)_______ HHN ___ CPAP___ BiPap___   N/A____      Home O2 Use :  No    If No for home O2---if presently on O2 during hospitalization:  Yes  if yes CM to follow for potential DC O2 need      Community Service Affiliation  Dialysis:  No    · Agency:  · Location:  · Dialysis Schedule:  · Phone:   · Fax:     Other Community Services:none     DISCHARGE PLAN: Explained Case Management role/services. Chart reviewed. Pt from home with spouse and plans to return. Spouse is currently admitted in ICU on vapotherm. Pt is [employer]. Will follow for possible home O2 needs. Pt has no preference in DME agency if needing home O2. Follow.

## 2021-11-24 VITALS
DIASTOLIC BLOOD PRESSURE: 67 MMHG | WEIGHT: 185 LBS | RESPIRATION RATE: 18 BRPM | BODY MASS INDEX: 27.4 KG/M2 | HEIGHT: 69 IN | OXYGEN SATURATION: 95 % | SYSTOLIC BLOOD PRESSURE: 116 MMHG | HEART RATE: 73 BPM | TEMPERATURE: 97.4 F

## 2021-11-24 LAB
A/G RATIO: 1 (ref 1.1–2.2)
ALBUMIN SERPL-MCNC: 3.2 G/DL (ref 3.4–5)
ALP BLD-CCNC: 89 U/L (ref 40–129)
ALT SERPL-CCNC: 41 U/L (ref 10–40)
ANION GAP SERPL CALCULATED.3IONS-SCNC: 11 MMOL/L (ref 3–16)
AST SERPL-CCNC: 21 U/L (ref 15–37)
BILIRUB SERPL-MCNC: <0.2 MG/DL (ref 0–1)
BUN BLDV-MCNC: 17 MG/DL (ref 7–20)
CALCIUM SERPL-MCNC: 8.9 MG/DL (ref 8.3–10.6)
CHLORIDE BLD-SCNC: 100 MMOL/L (ref 99–110)
CO2: 26 MMOL/L (ref 21–32)
CREAT SERPL-MCNC: 0.8 MG/DL (ref 0.9–1.3)
GFR AFRICAN AMERICAN: >60
GFR NON-AFRICAN AMERICAN: >60
GLUCOSE BLD-MCNC: 145 MG/DL (ref 70–99)
HCT VFR BLD CALC: 38.9 % (ref 40.5–52.5)
HEMOGLOBIN: 12.7 G/DL (ref 13.5–17.5)
MCH RBC QN AUTO: 27.1 PG (ref 26–34)
MCHC RBC AUTO-ENTMCNC: 32.6 G/DL (ref 31–36)
MCV RBC AUTO: 82.9 FL (ref 80–100)
PDW BLD-RTO: 14 % (ref 12.4–15.4)
PLATELET # BLD: 504 K/UL (ref 135–450)
PMV BLD AUTO: 7 FL (ref 5–10.5)
POTASSIUM SERPL-SCNC: 3.4 MMOL/L (ref 3.5–5.1)
RBC # BLD: 4.69 M/UL (ref 4.2–5.9)
SODIUM BLD-SCNC: 137 MMOL/L (ref 136–145)
TOTAL PROTEIN: 6.5 G/DL (ref 6.4–8.2)
WBC # BLD: 12 K/UL (ref 4–11)

## 2021-11-24 PROCEDURE — 6360000002 HC RX W HCPCS: Performed by: PHYSICIAN ASSISTANT

## 2021-11-24 PROCEDURE — 6370000000 HC RX 637 (ALT 250 FOR IP): Performed by: PHYSICIAN ASSISTANT

## 2021-11-24 PROCEDURE — 2580000003 HC RX 258: Performed by: PHYSICIAN ASSISTANT

## 2021-11-24 PROCEDURE — 6370000000 HC RX 637 (ALT 250 FOR IP): Performed by: INTERNAL MEDICINE

## 2021-11-24 PROCEDURE — 80053 COMPREHEN METABOLIC PANEL: CPT

## 2021-11-24 PROCEDURE — 99233 SBSQ HOSP IP/OBS HIGH 50: CPT | Performed by: INTERNAL MEDICINE

## 2021-11-24 PROCEDURE — 85027 COMPLETE CBC AUTOMATED: CPT

## 2021-11-24 PROCEDURE — 99239 HOSP IP/OBS DSCHRG MGMT >30: CPT | Performed by: INTERNAL MEDICINE

## 2021-11-24 PROCEDURE — 94761 N-INVAS EAR/PLS OXIMETRY MLT: CPT

## 2021-11-24 PROCEDURE — 36415 COLL VENOUS BLD VENIPUNCTURE: CPT

## 2021-11-24 PROCEDURE — 2700000000 HC OXYGEN THERAPY PER DAY

## 2021-11-24 RX ORDER — BENZONATATE 200 MG/1
200 CAPSULE ORAL 3 TIMES DAILY PRN
Qty: 21 CAPSULE | Refills: 0 | Status: SHIPPED | OUTPATIENT
Start: 2021-11-24 | End: 2021-12-01

## 2021-11-24 RX ORDER — POTASSIUM CHLORIDE 20 MEQ/1
20 TABLET, EXTENDED RELEASE ORAL ONCE
Status: COMPLETED | OUTPATIENT
Start: 2021-11-24 | End: 2021-11-24

## 2021-11-24 RX ORDER — DEXAMETHASONE 6 MG/1
6 TABLET ORAL DAILY
Qty: 7 TABLET | Refills: 0 | Status: SHIPPED | OUTPATIENT
Start: 2021-11-25 | End: 2021-12-02

## 2021-11-24 RX ADMIN — POTASSIUM CHLORIDE 20 MEQ: 1500 TABLET, EXTENDED RELEASE ORAL at 09:28

## 2021-11-24 RX ADMIN — HYDROCHLOROTHIAZIDE 25 MG: 25 TABLET ORAL at 09:29

## 2021-11-24 RX ADMIN — DEXAMETHASONE 6 MG: 1 TABLET ORAL at 09:28

## 2021-11-24 RX ADMIN — PANTOPRAZOLE SODIUM 40 MG: 40 TABLET, DELAYED RELEASE ORAL at 05:03

## 2021-11-24 RX ADMIN — ATORVASTATIN CALCIUM 20 MG: 10 TABLET, FILM COATED ORAL at 09:28

## 2021-11-24 RX ADMIN — SODIUM CHLORIDE, PRESERVATIVE FREE 10 ML: 5 INJECTION INTRAVENOUS at 09:29

## 2021-11-24 ASSESSMENT — PAIN SCALES - GENERAL: PAINLEVEL_OUTOF10: 0

## 2021-11-24 NOTE — PROGRESS NOTES
Patient awake at this time. Alert and oriented x 4. No needs noted. No pain noted. In bed, call light within reach.

## 2021-11-24 NOTE — PLAN OF CARE
Problem: Airway Clearance - Ineffective  Goal: Achieve or maintain patent airway  Outcome: Ongoing     Problem: Gas Exchange - Impaired  Goal: Absence of hypoxia  Outcome: Ongoing  Goal: Promote optimal lung function  Outcome: Ongoing     Problem: Breathing Pattern - Ineffective  Goal: Ability to achieve and maintain a regular respiratory rate  Outcome: Ongoing     Problem:  Body Temperature -  Risk of, Imbalanced  Goal: Ability to maintain a body temperature within defined limits  Outcome: Ongoing  Goal: Will regain or maintain usual level of consciousness  Outcome: Ongoing  Goal: Complications related to the disease process, condition or treatment will be avoided or minimized  Outcome: Ongoing     Problem: Isolation Precautions - Risk of Spread of Infection  Goal: Prevent transmission of infection  Outcome: Ongoing     Problem: Nutrition Deficits  Goal: Optimize nutritional status  Outcome: Ongoing     Problem: Risk for Fluid Volume Deficit  Goal: Maintain normal heart rhythm  Outcome: Ongoing  Goal: Maintain absence of muscle cramping  Outcome: Ongoing  Goal: Maintain normal serum potassium, sodium, calcium, phosphorus, and pH  Outcome: Ongoing     Problem: Loneliness or Risk for Loneliness  Goal: Demonstrate positive use of time alone when socialization is not possible  Outcome: Ongoing     Problem: Fatigue  Goal: Verbalize increase energy and improved vitality  Outcome: Ongoing     Problem: Patient Education: Go to Patient Education Activity  Goal: Patient/Family Education  Outcome: Ongoing     Problem: Falls - Risk of:  Goal: Will remain free from falls  Description: Will remain free from falls  Outcome: Ongoing  Goal: Absence of physical injury  Description: Absence of physical injury  Outcome: Ongoing Patient/Caregiver provided printed discharge information.

## 2021-11-24 NOTE — PROGRESS NOTES
Pt awake, wants to get report on his wife in [address] to unit gave them is cell phone number to call him with an update.

## 2021-11-24 NOTE — PLAN OF CARE
Problem: Airway Clearance - Ineffective  Goal: Achieve or maintain patent airway  11/24/2021 1355 by Theo Jesus RN  Outcome: Completed     Problem: Gas Exchange - Impaired  Goal: Absence of hypoxia  11/24/2021 1355 by Theo Jesus RN  Outcome: Completed     Problem: Gas Exchange - Impaired  Goal: Promote optimal lung function  11/24/2021 1355 by Theo Jesus RN  Outcome: Completed     Problem: Breathing Pattern - Ineffective  Goal: Ability to achieve and maintain a regular respiratory rate  11/24/2021 1355 by Theo Jesus RN  Outcome: Completed     Problem: Body Temperature -  Risk of, Imbalanced  Goal: Ability to maintain a body temperature within defined limits  11/24/2021 1355 by Theo Jesus RN  Outcome: Completed     Problem: Body Temperature -  Risk of, Imbalanced  Goal: Will regain or maintain usual level of consciousness  11/24/2021 1355 by Theo Jesus RN  Outcome: Completed     Problem:  Body Temperature -  Risk of, Imbalanced  Goal: Complications related to the disease process, condition or treatment will be avoided or minimized  11/24/2021 1355 by Theo Jesus RN  Outcome: Completed     Problem: Isolation Precautions - Risk of Spread of Infection  Goal: Prevent transmission of infection  11/24/2021 1355 by Theo Jesus RN  Outcome: Completed     Problem: Nutrition Deficits  Goal: Optimize nutritional status  11/24/2021 1355 by Theo Jesus RN  Outcome: Completed     Problem: Risk for Fluid Volume Deficit  Goal: Maintain normal heart rhythm  11/24/2021 1355 by Theo Jesus RN  Outcome: Completed     Problem: Risk for Fluid Volume Deficit  Goal: Maintain absence of muscle cramping  11/24/2021 1355 by Theo Jesus RN  Outcome: Completed     Problem: Risk for Fluid Volume Deficit  Goal: Maintain normal serum potassium, sodium, calcium, phosphorus, and pH  11/24/2021 1355 by Theo Jesus RN  Outcome: Completed     Problem: Loneliness or Risk for Loneliness  Goal: Demonstrate positive use of time alone when socialization is not possible  11/24/2021 1355 by Jono Noble RN  Outcome: Completed     Problem: Fatigue  Goal: Verbalize increase energy and improved vitality  11/24/2021 1355 by Jono Noble RN  Outcome: Completed     Problem: Patient Education: Go to Patient Education Activity  Goal: Patient/Family Education  11/24/2021 1355 by Jono Noble RN  Outcome: Completed     Problem: Falls - Risk of:  Goal: Will remain free from falls  Description: Will remain free from falls  11/24/2021 1355 by Jono Noble RN  Outcome: Completed     Problem: Falls - Risk of:  Goal: Absence of physical injury  Description: Absence of physical injury  11/24/2021 1355 by Jono Noble RN  Outcome: Completed

## 2021-11-24 NOTE — PROGRESS NOTES
Admit: 2021    Name:  Alfredo Orona  Room:  [facility]0208-  MRN:    [id number]     Daily Progress Note for 2021   Admitted with COVID-19 pneumonia and acute respiratory failure  Interval History:   Currently on 3 L of oxygen--> 1 L   Has a nonproductive cough. Scheduled Meds:   sodium chloride flush  5-40 mL IntraVENous 2 times per day    enoxaparin  30 mg SubCUTAneous BID    dexamethasone  6 mg Oral Daily    atorvastatin  20 mg Oral Daily    hydroCHLOROthiazide  25 mg Oral Daily    pantoprazole  40 mg Oral QAM AC       Continuous Infusions:   sodium chloride         PRN Meds:  sodium chloride flush, sodium chloride, ondansetron **OR** ondansetron, polyethylene glycol, acetaminophen **OR** acetaminophen, guaiFENesin-dextromethorphan, potassium chloride **OR** potassium alternative oral replacement **OR** potassium chloride, magnesium sulfate, ibuprofen, benzonatate                  Objective:     Temp  Av.5 °F (36.4 °C)  Min: 97 °F (36.1 °C)  Max: 98.2 °F (36.8 °C)  Pulse  Av.8  Min: 62  Max: 73  BP  Min: 119/71  Max: 127/73  SpO2  Av %  Min: 94 %  Max: 96 %  No data found. Intake/Output Summary (Last 24 hours) at 2021 0817  Last data filed at 2021 0321  Gross per 24 hour   Intake 960 ml   Output --   Net 960 ml       Physical Exam:    Gen: No distress. Alert. Eyes: PERRL. No sclera icterus. No conjunctival injection. ENT: No discharge. Pharynx clear. Neck: No JVD. No Carotid Bruit. Trachea midline. Resp: No accessory muscle use. No crackles. No wheezes. No rhonchi. CV: Regular rate. Regular rhythm. No murmur. No rub. No edema. GI: Non-tender. Non-distended. No masses. No organomegaly. Normal bowel sounds. No hernia. Skin: Warm and dry. No nodule on exposed extremities. No rash on exposed extremities. M/S: No cyanosis. No joint deformity. No clubbing. Neuro: Awake. Grossly nonfocal    Psych: Oriented x 3. No anxiety or agitation.   Lab Data:  CBC:   Recent Labs     11/22/21  1135 11/23/21  0548 11/24/21  0549   WBC 13.2* 10.4 12.0*   RBC 5.19 4.87 4.69   HGB 14.2 13.2* 12.7*   HCT 42.5 40.2* 38.9*   MCV 81.8 82.5 82.9   RDW 14.2 13.9 14.0   * 434 504*     BMP:   Recent Labs     11/22/21  1135 11/23/21  0548 11/24/21  0549    134* 137   K 3.1* 3.2* 3.4*   CL 93* 96* 100   CO2 29 26 26   BUN 13 17 17   CREATININE 0.8* 0.7* 0.8*     BNP: No results for input(s): BNP in the last 72 hours. PT/INR: No results for input(s): PROTIME, INR in the last 72 hours. APTT:No results for input(s): APTT in the last 72 hours. CARDIAC ENZYMES:   Recent Labs     11/22/21  1135 11/22/21 2010 11/23/21  0152   TROPONINI <0.01 <0.01 <0.01     FASTING LIPID PANEL:No results found for: CHOL, HDL, TRIG  LIVER PROFILE:   Recent Labs     11/22/21  1135 11/23/21  0548 11/24/21  0549   AST 28 26 21   ALT 36 39 41*   BILITOT 0.5 0.3 <0.2   ALKPHOS 106 92 89      XR CHEST PORTABLE   Final Result   Multifocal airspace disease. This pattern may reflect COVID pneumonia in the   correct clinical setting.                  Assessment & Plan:     Patient Active Problem List    Diagnosis Date Noted    Acute respiratory failure with hypoxia (HCC)     Abnormal EKG     Hypokalemia     Pneumonia due to COVID-19 virus 11/22/2021    Supraspinatus sprain 01/12/2015       COVID PNA  Acute Hypoxic Respiratory Failure   N/V/D  - CXR: with multifocal airspace disease, pattern reflects COVID PNA  - no home O2 at baseline  - pt is unvaccinated  - Admitted to 2W, droplet +, tele, cont pulse ox  - supp O2, wean as tolerated - 2L oxygen --> 1 L   - Decadron D#3,   PRN Robitussin  He is outside the window where remdesivir would be beneficial.  Pro-Christiano not elevated.     Abnormal EKG  - denies chest pain   - troponin <0.01-- trend  - EKG noted with baseline artifact T wave abnormality, consider inferior ischemia and prolonged Qtc  Had a negative stress echo for/2021 at [facility]  His coronary artery calcium score was 0.  Echo showed an EF of 60 to 65%     Hypokalemia  replaced     HTN  - controlled continue home meds: HCTZ        HLD  - Continue statin       DVT Prophylaxis: Lovenox BID   Diet: ADULT DIET;  Regular  Code Status: No Order     Naz Motley MD

## 2021-11-24 NOTE — PROGRESS NOTES
Pulmonary Progress Note  CC: COVID-19 pneumonia in an unvaccinated individual    Subjective:  C/o cough, on ra      EXAM: /67   Pulse 73   Temp 97.4 °F (36.3 °C) (Oral)   Resp 18   Ht 5' 9\" (1.753 m)   Wt 185 lb (83.9 kg)   SpO2 95%   BMI 27.32 kg/m²  on ra  Constitutional:  No acute distress. Eyes: PERRL. Conjunctivae anicteric. ENT: Normal nose. Normal tongue. Neck:  Trachea is midline. No thyroid tenderness. Respiratory: No accessory muscle usage. No wheezes. No rales. No Rhonchi. Cardiovascular: Normal S1S2. No digit clubbing. No digit cyanosis. No LE edema. Psychiatric: No anxiety or Agitation. Alert and Oriented to person, place and time.     Scheduled Meds:   sodium chloride flush  5-40 mL IntraVENous 2 times per day    enoxaparin  30 mg SubCUTAneous BID    dexamethasone  6 mg Oral Daily    atorvastatin  20 mg Oral Daily    hydroCHLOROthiazide  25 mg Oral Daily    pantoprazole  40 mg Oral QAM AC     Continuous Infusions:   sodium chloride       PRN Meds:  sodium chloride flush, sodium chloride, ondansetron **OR** ondansetron, polyethylene glycol, acetaminophen **OR** acetaminophen, guaiFENesin-dextromethorphan, potassium chloride **OR** potassium alternative oral replacement **OR** potassium chloride, magnesium sulfate, ibuprofen, benzonatate    Labs:  CBC:   Recent Labs     11/22/21  1135 11/23/21  0548 11/24/21  0549   WBC 13.2* 10.4 12.0*   HGB 14.2 13.2* 12.7*   HCT 42.5 40.2* 38.9*   MCV 81.8 82.5 82.9   * 434 504*     BMP:   Recent Labs     11/22/21  1135 11/23/21  0548 11/24/21  0549    134* 137   K 3.1* 3.2* 3.4*   CL 93* 96* 100   CO2 29 26 26   BUN 13 17 17   CREATININE 0.8* 0.7* 0.8*     RADIOLOGY  XR CHEST PORTABLE   Final Result   Multifocal airspace disease.  This pattern may reflect COVID pneumonia in the   correct clinical setting.            ASSESSMENT:  · COVID-19 pneumonia in an unvaccinated individual  · Acute hypoxic respiratory

## 2021-11-26 ENCOUNTER — CARE COORDINATION (OUTPATIENT)
Dept: CASE MANAGEMENT | Age: 56
End: 2021-11-26

## 2021-11-26 LAB
BLOOD CULTURE, ROUTINE: NORMAL
CULTURE, BLOOD 2: NORMAL

## 2021-11-26 NOTE — CARE COORDINATION
Patient contacted regarding COVID-19 diagnosis. Discussed COVID-19 related testing which was available at this time. COVID-19 Detected on 2021. Patient informed of results, if available? Yes    Call within 2 business days of discharge: Yes  Discharge Date: 21   RARS: Readmission Risk Score: 8.8 ( )    Was this an external facility discharge? No Discharge Facility: [facility] Transition Nurse contacted the patient by telephone to perform post discharge assessment. Verified name and  with patient as identifiers. Provided introduction to self, and explanation of the CTN role, and reason for call due to risk factors for infection and/or exposure to COVID-19. Symptoms reviewed with patient who verbalized the following symptoms: cough, no new symptoms and no worsening symptoms. Due to no new or worsening symptoms encounter was not routed to provider for escalation. Discussed follow-up appointments. If no appointment was previously scheduled, appointment scheduling offered: has appt on Thursday next week  [facility] follow up appointment(s): No future appointments. Non-[facility] follow up appointment(s): PCP confirmed as Dr Palak Crowder and he has appt as noted    Non-face-to-face services provided:  Obtained and reviewed discharge summary and/or continuity of care documents    Advance Care Planning:   Does patient have an Advance Directive:  reviewed and current. Educated patient about risk for severe COVID-19 due to risk factors according to CDC guidelines. CTN reviewed discharge instructions, medical action plan and red flag symptoms patient who verbalized understanding. Discussed COVID vaccination status No. Education provided on COVID-19 vaccination as appropriate. Discussed exposure protocols and quarantine with CDC Guidelines.  Patient was given an opportunity to verbalize any questions and concerns and agrees to contact the CTN or health care provider for questions related to their healthcare. Reviewed and educated patient on any new and changed medications related to discharge diagnosis. Was patient discharged with a pulse oximeter? Yes CTN discussed pulse oximeter instructions and when to notify provider or seek emergency care. SaO2 93-94% RA but does drop a little with exertion at which point he rests and DB. Eating several yogurts/day and having normal BM. Wife is in ICU at [facility]. He is preparing for spouse to return home. CTN provided contact information for future needs. Plan for follow-up call in 5-7 days based on severity of symptoms and risk factors.     Ladonna Glass RN  Care Transitions Nurse  [phone] mobile

## 2021-11-30 NOTE — DISCHARGE SUMMARY
Name:  Debora Haynes  Room:  0208/0208-01  MRN:    [id number]    Discharge Summary      This discharge summary is in conjunction with a complete physical exam done on the day of discharge. Discharging Physician: Verona Avitia MD      Admit: 11/22/2021  Discharge:  11/24/2021    Diagnoses this Admission    Active Problems:    Pneumonia due to COVID-19 virus    Acute respiratory failure with hypoxia (HCC)    Abnormal EKG    Hypokalemia  Resolved Problems:    * No resolved hospital problems. *          Procedures (Please Review Full Report for Details)      Consults    IP CONSULT TO HOSPITALIST  IP CONSULT TO PULMONOLOGY      HPI:    The patient is a 55 y.o. male with PMH of chronic back pain, HTN, andd  HLD who presents to [facility] with complaints of cough, congestion for the last 2 weeks. History obtained from the patient and review of EMR. Pt stated both him and his wife have been sick for over 2 weeks. He stated he started to feel better after a week and was started on Amoxicillin and remained with cough and fever at home. Over the last week he has been nauseated and had 2 episodes of diarrhea over the last 3 days. He stated he was not feeling better and his cough has been ongoing and makes his throat tickle. He does have some body aches  He stated that they placed him on oxygen in ED due to his coughing spells. He stated that he thought he had a cold then the flu. Pt did desat in the ED with ambulation. Pt is not vaccinated and he is COVID positive. Pt and wife is admitted for COVID and currently in the same room.     In ED CXR airspace diease reflected  COVID PNA. Pt was COVID positive. He did desat with ambulation in ED. EKG showed some baseline artifact and showed nonspecific ST abnormality, prolonged QTc. Troponin 0.01. Denies any chest pain. Admit to 2W with continuous pulse ox.       Physical Exam at Discharge:  /67   Pulse 73   Temp 97.4 °F (36.3 °C) (Oral) Resp 18   Ht 5' 9\" (1.753 m)   Wt 185 lb (83.9 kg)   SpO2 95%   BMI 27.32 kg/m²         Hospital Course    COVID PNA  Acute Hypoxic Respiratory Failure   N/V/D  - CXR: with multifocal airspace disease, pattern reflects COVID PNA  - no home O2 at baseline  - pt is unvaccinated  - Admitted to 2W, droplet +, tele, cont pulse ox  - supp O2, wean as tolerated - 2L oxygen --> 1 L   - Decadron D#3,   PRN Robitussin  He is outside the window where remdesivir would be beneficial.  Pro-Christiano not elevated. D/c ed on RA      Abnormal EKG  - denies chest pain   - troponin <0.01-- trend  - EKG noted with baseline artifact T wave abnormality, consider inferior ischemia and prolonged Qtc  Had a negative stress echo for/2021 at [facility]  His coronary artery calcium score was 0.  Echo showed an EF of 60 to 65%     Hypokalemia  replaced     HTN  - controlled continue home meds: HCTZ         XR CHEST PORTABLE   Final Result   Multifocal airspace disease. This pattern may reflect COVID pneumonia in the   correct clinical setting.                 Discharge Medications     Medication List      START taking these medications    benzonatate 200 MG capsule  Commonly known as: TESSALON  Take 1 capsule by mouth 3 times daily as needed for Cough     dexamethasone 6 MG tablet  Commonly known as: DECADRON  Take 1 tablet by mouth daily for 7 doses        CONTINUE taking these medications    atorvastatin 20 MG tablet  Commonly known as: LIPITOR     Biotin 1 MG Caps     hydroCHLOROthiazide 25 MG tablet  Commonly known as: HYDRODIURIL     omeprazole 20 MG delayed release capsule  Commonly known as: PRILOSEC        STOP taking these medications    ibuprofen 600 MG tablet  Commonly known as: ADVIL;MOTRIN           Where to Get Your Medications      These medications were sent to [id number] [facility], [address] [place] [address] [address] [facility] [address] [address]    Phone: [phone]   · benzonatate 200 MG capsule  · dexamethasone 6 MG tablet           Discharge Condition/Location: Stable    Follow Up: Follow up with PCP.         Cortes Cason MD 11/30/2021 10:48 AM

## 2021-12-01 ENCOUNTER — CARE COORDINATION (OUTPATIENT)
Dept: CASE MANAGEMENT | Age: 56
End: 2021-12-01

## 2021-12-01 NOTE — CARE COORDINATION
Patient contacted regarding COVID-19 diagnosis. COVID-19 Detected on 11/22/2021. Care Transition Nurse contacted the patient by telephone to perform follow-up assessment. Symptoms reviewed with patient who verbalized the following symptoms: cough, shortness of breath, no new symptoms and no worsening symptoms. Due to no new or worsening symptoms encounter was not routed to provider for escalation. Patient was given an opportunity to verbalize any questions and concerns and agrees to contact the CTN or health care provider for questions related to their healthcare. Was patient discharged with a pulse oximeter? Yes CTN reviewed pulse oximeter instructions and when to notify provider or seek emergency care. Cough improving. Heart rate is elevated from baseline. He is cleared from quarantine and plans to sit with his wife in ICU tomorrow for an hour as allowed and feels that will be good for him. He limits his activity and avoids stairs (lives in 2 story). SaO2 maintaining in 90s. He denies needs at this time. CTN provided contact information for future reference. Plan for follow-up call in 5-7 days based on severity of symptoms and risk factors. Elida Yu RN  Care Transition Nurse  [phone] mobile    No future appointments.

## 2021-12-07 ENCOUNTER — CARE COORDINATION (OUTPATIENT)
Dept: CASE MANAGEMENT | Age: 56
End: 2021-12-07

## 2021-12-07 NOTE — CARE COORDINATION
Patient contacted regarding COVID-19 diagnosis. COVID-19 Detected on 11/22/2021. Care Transition Nurse contacted the patient by telephone to perform follow-up assessment. Symptoms reviewed with patient who verbalized the following symptoms: no new symptoms and no worsening symptoms. Due to no new or worsening symptoms encounter was not routed to provider for escalation. Went to dentist today. SaO2 99% RA. Wife still in ICU and will have breathing trial today. He denies needs at this time. CTN provided contact information for future reference. Plan for follow-up call in 5-7 days based on severity of symptoms and risk factors. Christian Somers, RN  Care Transition Nurse  [phone] mobile    No future appointments.

## 2021-12-14 ENCOUNTER — CARE COORDINATION (OUTPATIENT)
Dept: CASE MANAGEMENT | Age: 56
End: 2021-12-14

## 2021-12-14 NOTE — CARE COORDINATION
Patient contacted regarding COVID-19 diagnosis. COVID-19 Detected on 11/22/2021.       Care Transition Nurse contacted the patient by telephone to perform follow-up assessment. Symptoms reviewed with patient who verbalized the following symptoms: no new symptoms and no worsening symptoms. Due to no new or worsening symptoms encounter was not routed to provider for escalation. Patient was given an opportunity to verbalize any questions and concerns and agrees to contact the CTN or health care provider for questions related to their healthcare. Reports feeling improved. Wife still hospitalized in PCU. He is able to visit her daily and she can use phone to call him. She is usually better after his visit. Plan for her to discharge to [facility]U [facility] when improved. Discussed he needs to speak up if he has any concerns about his health/recovery from [place]. He voices understanding. Denies needs for himself at this time. CTN provided contact information for future reference. No further follow-up call identified based on severity of symptoms and risk factors. Nathan Wagner RN  Care Transition Nurse  [phone] mobile    No future appointments.

## 2022-02-02 ENCOUNTER — TELEPHONE (OUTPATIENT)
Dept: ORTHOPEDIC SURGERY | Age: 57
End: 2022-02-02

## 2022-02-02 NOTE — TELEPHONE ENCOUNTER
E-mailed all [facility] medical records on file to [email or web address]

## 2025-04-16 ENCOUNTER — APPOINTMENT (OUTPATIENT)
Dept: CT IMAGING | Age: 60
End: 2025-04-16
Payer: OTHER GOVERNMENT

## 2025-04-16 ENCOUNTER — HOSPITAL ENCOUNTER (EMERGENCY)
Age: 60
Discharge: HOME | End: 2025-04-16
Attending: STUDENT IN AN ORGANIZED HEALTH CARE EDUCATION/TRAINING PROGRAM
Payer: OTHER GOVERNMENT

## 2025-04-16 VITALS
HEART RATE: 71 BPM | SYSTOLIC BLOOD PRESSURE: 142 MMHG | RESPIRATION RATE: 18 BRPM | DIASTOLIC BLOOD PRESSURE: 89 MMHG | BODY MASS INDEX: 25.4 KG/M2 | OXYGEN SATURATION: 97 % | TEMPERATURE: 98.1 F | WEIGHT: 172 LBS

## 2025-04-16 DIAGNOSIS — R03.0 ELEVATED BLOOD PRESSURE READING: Primary | ICD-10-CM

## 2025-04-16 DIAGNOSIS — R51.9 RECURRENT HEADACHE: ICD-10-CM

## 2025-04-16 LAB
ALBUMIN SERPL-MCNC: 4.1 G/DL (ref 3.4–5)
ALBUMIN/GLOB SERPL: 1.7 {RATIO} (ref 1.1–2.2)
ALP SERPL-CCNC: 106 U/L (ref 40–129)
ALT SERPL-CCNC: 34 U/L (ref 10–40)
ANION GAP SERPL CALCULATED.3IONS-SCNC: 12 MMOL/L (ref 3–16)
AST SERPL-CCNC: 35 U/L (ref 15–37)
BASOPHILS # BLD: 0.1 K/UL (ref 0–0.2)
BASOPHILS NFR BLD: 1.3 %
BILIRUB SERPL-MCNC: <0.2 MG/DL (ref 0–1)
BUN SERPL-MCNC: 16 MG/DL (ref 7–20)
CALCIUM SERPL-MCNC: 9.2 MG/DL (ref 8.3–10.6)
CHLORIDE SERPL-SCNC: 104 MMOL/L (ref 99–110)
CO2 SERPL-SCNC: 23 MMOL/L (ref 21–32)
CREAT SERPL-MCNC: 0.9 MG/DL (ref 0.9–1.3)
DEPRECATED RDW RBC AUTO: 14.1 % (ref 12.4–15.4)
EOSINOPHIL # BLD: 0.1 K/UL (ref 0–0.6)
EOSINOPHIL NFR BLD: 1.4 %
FLUAV RNA RESP QL NAA+PROBE: NOT DETECTED
FLUBV RNA RESP QL NAA+PROBE: NOT DETECTED
GFR SERPLBLD CREATININE-BSD FMLA CKD-EPI: >90 ML/MIN/{1.73_M2}
GLUCOSE SERPL-MCNC: 104 MG/DL (ref 70–99)
HCT VFR BLD AUTO: 45.9 % (ref 40.5–52.5)
HGB BLD-MCNC: 15.2 G/DL (ref 13.5–17.5)
LYMPHOCYTES # BLD: 1.6 K/UL (ref 1–5.1)
LYMPHOCYTES NFR BLD: 19.6 %
MCH RBC QN AUTO: 28.4 PG (ref 26–34)
MCHC RBC AUTO-ENTMCNC: 33.1 G/DL (ref 31–36)
MCV RBC AUTO: 85.7 FL (ref 80–100)
MONOCYTES # BLD: 0.5 K/UL (ref 0–1.3)
MONOCYTES NFR BLD: 6 %
NEUTROPHILS # BLD: 5.8 K/UL (ref 1.7–7.7)
NEUTROPHILS NFR BLD: 71.7 %
PLATELET # BLD AUTO: 334 K/UL (ref 135–450)
PMV BLD AUTO: 7.1 FL (ref 5–10.5)
POTASSIUM SERPL-SCNC: 4 MMOL/L (ref 3.5–5.1)
PROT SERPL-MCNC: 6.5 G/DL (ref 6.4–8.2)
RBC # BLD AUTO: 5.35 M/UL (ref 4.2–5.9)
SARS-COV-2 RNA RESP QL NAA+PROBE: NOT DETECTED
SODIUM SERPL-SCNC: 139 MMOL/L (ref 136–145)
WBC # BLD AUTO: 8.1 K/UL (ref 4–11)

## 2025-04-16 PROCEDURE — 72125 CT NECK SPINE W/O DYE: CPT

## 2025-04-16 PROCEDURE — 85025 COMPLETE CBC W/AUTO DIFF WBC: CPT

## 2025-04-16 PROCEDURE — 70450 CT HEAD/BRAIN W/O DYE: CPT

## 2025-04-16 PROCEDURE — 99284 EMERGENCY DEPT VISIT MOD MDM: CPT

## 2025-04-16 PROCEDURE — 87636 SARSCOV2 & INF A&B AMP PRB: CPT

## 2025-04-16 PROCEDURE — 80053 COMPREHEN METABOLIC PANEL: CPT

## 2025-04-16 ASSESSMENT — VISUAL ACUITY: OU: 1

## 2025-04-16 ASSESSMENT — PAIN - FUNCTIONAL ASSESSMENT: PAIN_FUNCTIONAL_ASSESSMENT: NONE - DENIES PAIN

## 2025-04-16 NOTE — DISCHARGE INSTRUCTIONS
You were evaluated in the ED today for your headaches. Assessments and testing completed during your visit were reassuring and at this time there is no indication for further testing, treatment or admission to the hospital. Given this, it is appropriate to discharge you from the emergency department.     Please schedule an appointment with your PCP within the next week for follow up and to discuss blood pressure control. Return to the ED immediately if you experience any new or worsening symptoms, including but not limited to, repeated vomiting, worsening headache, problems with memory or confusion, focal neurologic deficits (e.g., weakness, numbness), abnormal behavior, increased sleepiness or passing out, seizures. These symptoms may indicate a serious condition that requires prompt medical attention.    Stay hydrated and maintain a regular sleep schedule. Avoid alcohol and recreational drugs. Use over-the-counter pain relievers such as acetaminophen or ibuprofen as directed for headache relief. Avoid using opioids or barbiturates unless specifically prescribed.    Headaches can be caused by various factors, including stress, dehydration, and underlying medical conditions. It is important to identify and manage these triggers to prevent future episodes. If you have a history of migraines, discuss preventive treatment options with at your follow up appointment.    Please note that sometimes it is difficult to diagnose a medical condition early in the disease process before the disease fully manifests. Because of this, should you develop any new or worsening symptoms as discussed above, you may return at any time to the emergency department for another evaluation.  Thank you for allowing us to care for you today.

## 2025-04-16 NOTE — ED PROVIDER NOTES
[facility] EMERGENCY DEPARTMENT  EMERGENCY DEPARTMENT ENCOUNTER        Pt Name: Arian Underwood  MRN: [id number]  Birthdate 1965  Date of evaluation: 4/16/2025  Provider: Teresita Jarrett PA-C  PCP: Wade Bone MD  Note Started: 5:21 PM EDT 4/16/25       I have seen and evaluated this patient with my supervising physician Mariah Seay MD.      CHIEF COMPLAINT       Chief Complaint   Patient presents with    Headache     Has been having recurrent headaches for a few weeks.  Comes on about twice a day.  Goes away without taking any meds.       HISTORY OF PRESENT ILLNESS: 1 or more Elements     History From: Patient    Limitations to history : None    Social Determinants Significantly Affecting Health : None    Chief Complaint: Headaches    Arian Underwood is a 60 y.o. male with a PMHx of HLD, HTN, GERD who presents to the ED with new onset of daily headaches for the past 2 weeks.  Headaches are typically at the crown of his head, and are gradual in onset.  He reports that last Thursday (6 days ago) he had a particularly bad headache, so he saw the company nurse practitioner at his job, and his blood pressure was elevated with a systolic of 170s.  Repeat blood pressure was taken an hour later, and was still elevated, so he was started on lisinopril.  He has not noticed any significant change in his headaches since beginning this.  He also notes that about 3 months ago he fell off the back of his truck, and hit his head.  He was bleeding pretty heavily on the back of his head, but was not evaluated after the fall.  Since then he notices a \"crunching\" sensation in his neck with lateral neck rotation.  He is not on anticoagulation.  He drinks alcohol once or twice a year.  He recently traveled to [place], and the headache started just after returning.  Denies paresthesias, dizziness, vision changes, hearing changes, back pain, fevers, drenching night sweats, unintentional weight loss, rashes,

## 2025-04-17 NOTE — ED PROVIDER NOTES
THIS IS MY BUTCH SUPERVISORY AND SHARED VISIT NOTE:    I personally saw the patient and made/approved the management plan and take responsibility for the patient management.    Labs Reviewed   COMPREHENSIVE METABOLIC PANEL W/ REFLEX TO MG FOR LOW K - Abnormal; Notable for the following components:       Result Value    Glucose 104 (*)     All other components within normal limits   COVID-19 & INFLUENZA COMBO   CBC WITH AUTO DIFFERENTIAL     CT C-Spine W/O Contrast   Final Result   CT BRAIN      no acute intracranial abnormality.      CT CERVICAL SPINE      No acute fracture.         CT Head W/O Contrast   Final Result   CT BRAIN      no acute intracranial abnormality.      CT CERVICAL SPINE      No acute fracture.           History: Patient is a 59-year-old male, presenting with concerns for intermittent headaches for the past few weeks.  Patient states that prior to a few weeks ago this is never happened to him before.  They are not sudden onset or thunderclap in nature.  He does not take anything for them and then they tend to go away.  States that his blood pressure has been somewhat high at home and wonders if this could be related.  He denies any vision changes, numbness or tingling.  Denies any other complaints or concerns.    Exam: Patient is resting comfortably is in no acute distress.  Heart regular rate and rhythm.  Lungs clear to auscultation bilaterally.  Abdomen soft, nondistended, nontender.  Cranial nerves II through XII grossly intact bilaterally.  No sensory deficits.  Strength 5 out of 5 in bilateral upper and lower extremities.    MDM: Patient is a 59-year-old male, presenting with concerns for intermittent headaches for the past few weeks.  They tend to resolve without any treatment.  Upon arrival in the ED, vitals remarkable for mild hypertension.  Otherwise reassuring.  Patient is resting comfortably and is in no acute distress.  His neurologic exam is within normal limits.  CT of the head was